# Patient Record
Sex: FEMALE | URBAN - METROPOLITAN AREA
[De-identification: names, ages, dates, MRNs, and addresses within clinical notes are randomized per-mention and may not be internally consistent; named-entity substitution may affect disease eponyms.]

---

## 2022-08-12 ENCOUNTER — APPOINTMENT (OUTPATIENT)
Dept: URBAN - METROPOLITAN AREA CLINIC 204 | Age: 32
Setting detail: DERMATOLOGY
End: 2022-08-12

## 2022-08-12 DIAGNOSIS — Q828 OTHER SPECIFIED ANOMALIES OF SKIN: ICD-10-CM

## 2022-08-12 DIAGNOSIS — Q819 OTHER SPECIFIED ANOMALIES OF SKIN: ICD-10-CM

## 2022-08-12 DIAGNOSIS — Q826 OTHER SPECIFIED ANOMALIES OF SKIN: ICD-10-CM

## 2022-08-12 DIAGNOSIS — L30.0 NUMMULAR DERMATITIS: ICD-10-CM

## 2022-08-12 PROBLEM — L85.8 OTHER SPECIFIED EPIDERMAL THICKENING: Status: ACTIVE | Noted: 2022-08-12

## 2022-08-12 PROCEDURE — OTHER PRESCRIPTION: OTHER

## 2022-08-12 PROCEDURE — OTHER COUNSELING: OTHER

## 2022-08-12 PROCEDURE — 99204 OFFICE O/P NEW MOD 45 MIN: CPT

## 2022-08-12 RX ORDER — TRIAMCINOLONE ACETONIDE 1 MG/G
CREAM TOPICAL TWICE A DAY
Qty: 80 | Refills: 5 | Status: ERX | COMMUNITY
Start: 2022-08-12

## 2022-08-12 ASSESSMENT — LOCATION DETAILED DESCRIPTION DERM
LOCATION DETAILED: RIGHT PROXIMAL POSTERIOR UPPER ARM
LOCATION DETAILED: LEFT PROXIMAL POSTERIOR UPPER ARM
LOCATION DETAILED: LEFT ULNAR DORSAL HAND
LOCATION DETAILED: LEFT DORSAL FOOT
LOCATION DETAILED: RIGHT ULNAR DORSAL HAND

## 2022-08-12 ASSESSMENT — LOCATION SIMPLE DESCRIPTION DERM
LOCATION SIMPLE: RIGHT HAND
LOCATION SIMPLE: LEFT HAND
LOCATION SIMPLE: RIGHT POSTERIOR UPPER ARM
LOCATION SIMPLE: LEFT FOOT
LOCATION SIMPLE: LEFT POSTERIOR UPPER ARM

## 2022-08-12 ASSESSMENT — LOCATION ZONE DERM
LOCATION ZONE: FEET
LOCATION ZONE: HAND
LOCATION ZONE: ARM

## 2022-08-12 NOTE — HPI: SKIN LESION
How Severe Is Your Skin Lesion?: mild
Has Your Skin Lesion Been Treated?: not been treated
Is This A New Presentation, Or A Follow-Up?: Skin Lesion Referral
Additional History: Pt states that she has been putting clobetasol ointment on it and was given prednisone for it.
Who Is Your Referring Provider?:

## 2023-04-26 LAB — GROUP A STREP, PCR: NOT DETECTED

## 2023-09-19 PROBLEM — D68.51 FACTOR V LEIDEN MUTATION (MULTI): Status: ACTIVE | Noted: 2023-09-19

## 2023-09-19 PROBLEM — E66.9 OBESITY: Status: ACTIVE | Noted: 2023-09-19

## 2023-09-19 PROBLEM — K21.9 GASTROESOPHAGEAL REFLUX DISEASE WITHOUT ESOPHAGITIS: Status: ACTIVE | Noted: 2023-09-19

## 2023-09-19 PROBLEM — Z79.899 MEDICATION MANAGEMENT: Status: ACTIVE | Noted: 2023-09-19

## 2023-09-19 RX ORDER — TRIAMCINOLONE ACETONIDE 1 MG/G
CREAM TOPICAL
COMMUNITY
Start: 2023-01-10 | End: 2023-10-19 | Stop reason: ALTCHOICE

## 2023-09-19 RX ORDER — APIXABAN 2.5 MG/1
1 TABLET, FILM COATED ORAL 2 TIMES DAILY
COMMUNITY
Start: 2019-10-17 | End: 2023-10-19 | Stop reason: SDUPTHER

## 2023-09-19 RX ORDER — PANTOPRAZOLE SODIUM 40 MG/1
1 TABLET, DELAYED RELEASE ORAL DAILY
COMMUNITY
Start: 2020-09-01 | End: 2023-10-19 | Stop reason: ALTCHOICE

## 2023-09-19 RX ORDER — CLOBETASOL PROPIONATE 0.5 MG/G
OINTMENT TOPICAL 2 TIMES DAILY
COMMUNITY
Start: 2022-06-27 | End: 2023-10-19 | Stop reason: ALTCHOICE

## 2023-10-16 ENCOUNTER — LAB (OUTPATIENT)
Dept: LAB | Facility: LAB | Age: 33
End: 2023-10-16

## 2023-10-16 DIAGNOSIS — I82.409 ACUTE EMBOLISM AND THROMBOSIS OF UNSPECIFIED DEEP VEINS OF UNSPECIFIED LOWER EXTREMITY (MULTI): Primary | ICD-10-CM

## 2023-10-16 DIAGNOSIS — I82.409 ACUTE EMBOLISM AND THROMBOSIS OF UNSPECIFIED DEEP VEINS OF UNSPECIFIED LOWER EXTREMITY (MULTI): ICD-10-CM

## 2023-10-16 LAB
ALBUMIN SERPL BCP-MCNC: 4.3 G/DL (ref 3.4–5)
ALP SERPL-CCNC: 57 U/L (ref 33–110)
ALT SERPL W P-5'-P-CCNC: 15 U/L (ref 7–45)
ANION GAP SERPL CALC-SCNC: 11 MMOL/L (ref 10–20)
AST SERPL W P-5'-P-CCNC: 15 U/L (ref 9–39)
BASOPHILS # BLD AUTO: 0.09 X10*3/UL (ref 0–0.1)
BASOPHILS NFR BLD AUTO: 0.9 %
BILIRUB SERPL-MCNC: 0.3 MG/DL (ref 0–1.2)
BUN SERPL-MCNC: 10 MG/DL (ref 6–23)
CALCIUM SERPL-MCNC: 9.4 MG/DL (ref 8.6–10.3)
CHLORIDE SERPL-SCNC: 102 MMOL/L (ref 98–107)
CO2 SERPL-SCNC: 28 MMOL/L (ref 21–32)
CREAT SERPL-MCNC: 0.85 MG/DL (ref 0.5–1.05)
D DIMER PPP FEU-MCNC: 407 NG/ML FEU
EOSINOPHIL # BLD AUTO: 0.17 X10*3/UL (ref 0–0.7)
EOSINOPHIL NFR BLD AUTO: 1.7 %
ERYTHROCYTE [DISTWIDTH] IN BLOOD BY AUTOMATED COUNT: 12.2 % (ref 11.5–14.5)
GFR SERPL CREATININE-BSD FRML MDRD: >90 ML/MIN/1.73M*2
GLUCOSE SERPL-MCNC: 86 MG/DL (ref 74–99)
HCT VFR BLD AUTO: 45.5 % (ref 36–46)
HGB BLD-MCNC: 14.4 G/DL (ref 12–16)
IMM GRANULOCYTES # BLD AUTO: 0.03 X10*3/UL (ref 0–0.7)
IMM GRANULOCYTES NFR BLD AUTO: 0.3 % (ref 0–0.9)
LYMPHOCYTES # BLD AUTO: 3.37 X10*3/UL (ref 1.2–4.8)
LYMPHOCYTES NFR BLD AUTO: 33.5 %
MCH RBC QN AUTO: 28.6 PG (ref 26–34)
MCHC RBC AUTO-ENTMCNC: 31.6 G/DL (ref 32–36)
MCV RBC AUTO: 90 FL (ref 80–100)
MONOCYTES # BLD AUTO: 0.59 X10*3/UL (ref 0.1–1)
MONOCYTES NFR BLD AUTO: 5.9 %
NEUTROPHILS # BLD AUTO: 5.8 X10*3/UL (ref 1.2–7.7)
NEUTROPHILS NFR BLD AUTO: 57.7 %
NRBC BLD-RTO: 0 /100 WBCS (ref 0–0)
PLATELET # BLD AUTO: 336 X10*3/UL (ref 150–450)
PMV BLD AUTO: 9.3 FL (ref 7.5–11.5)
POTASSIUM SERPL-SCNC: 3.9 MMOL/L (ref 3.5–5.3)
PROT SERPL-MCNC: 7.6 G/DL (ref 6.4–8.2)
RBC # BLD AUTO: 5.04 X10*6/UL (ref 4–5.2)
SODIUM SERPL-SCNC: 137 MMOL/L (ref 136–145)
WBC # BLD AUTO: 10.1 X10*3/UL (ref 4.4–11.3)

## 2023-10-16 PROCEDURE — 36415 COLL VENOUS BLD VENIPUNCTURE: CPT

## 2023-10-16 PROCEDURE — 85025 COMPLETE CBC W/AUTO DIFF WBC: CPT

## 2023-10-16 PROCEDURE — 80053 COMPREHEN METABOLIC PANEL: CPT

## 2023-10-16 PROCEDURE — 85379 FIBRIN DEGRADATION QUANT: CPT

## 2023-10-19 ENCOUNTER — OFFICE VISIT (OUTPATIENT)
Dept: HEMATOLOGY/ONCOLOGY | Facility: CLINIC | Age: 33
End: 2023-10-19
Payer: COMMERCIAL

## 2023-10-19 VITALS
WEIGHT: 281.53 LBS | RESPIRATION RATE: 18 BRPM | BODY MASS INDEX: 49.88 KG/M2 | OXYGEN SATURATION: 96 % | SYSTOLIC BLOOD PRESSURE: 127 MMHG | HEART RATE: 91 BPM | DIASTOLIC BLOOD PRESSURE: 88 MMHG | HEIGHT: 63 IN

## 2023-10-19 DIAGNOSIS — D68.51 FACTOR V LEIDEN MUTATION (MULTI): Primary | ICD-10-CM

## 2023-10-19 PROCEDURE — 99213 OFFICE O/P EST LOW 20 MIN: CPT

## 2023-10-19 RX ORDER — APIXABAN 2.5 MG/1
2.5 TABLET, FILM COATED ORAL 2 TIMES DAILY
Qty: 30 TABLET | Refills: 6 | Status: SHIPPED | OUTPATIENT
Start: 2023-10-19 | End: 2023-12-15

## 2023-10-19 ASSESSMENT — PAIN SCALES - GENERAL: PAINLEVEL: 0-NO PAIN

## 2023-10-19 NOTE — PROGRESS NOTES
Patient ID: Page Solares is a 33 y.o. female.    Subjective    HPI    Patient Visit Information:   Visit Type: Follow Up Visit     History of Present Illness:     ID Statement:    PAGE SOLARES is a 33 year old Female      Chief Complaint: Evaluation for hx of DVT   Interval History:    Referred by Dr. Ugalde     Reason for referral: DVT     HPI   32 year old woman with hx of GERD, obesity and DVT referred for evaluation of hx of DVT and heterozygous FVL mutation     she had 2 blood clots 10 years in 2012 and then 6 years ago in 2016   it was a [employer]   DVT in [place]  she saw a hematologist   hypercoagulable testing per patient showed heterozygous FVL mutation   she has been on Eliquis 2.5 mg twice a day , Dr. Ugalde lowered the dose about a year ago, no complications   no new events since 6 years ago     the first blood clot she was on OCP and she was not moving a lot in her work , she completed around 6-8 months of warfarin   the second episode she started having pain behind the knee   she has no major complications with the Eliquis     no abd pain , no nausea or vomiting   she goes to gyn   no fever , infections   no dysuria, no other complaints     interval history - 10/19/23    she is doing well   Eating and drinking well   Abdominal pain/cramping with dairy occasionally  No leg pain, legs remain to tire easily   No numbness or tingling in hands or feet  left leg is slightly edema, constantly little a larger  she is on Eliquis 2.5 mg twice a day, and tolerating well   Regular since stopping birth control  No urinary issues  No nausea, vomiting, diarrhea, constipation   No hematuria, hematochezia or melena   No bruising or bleeding issues   Breathing is stable, no SOB  No chest pain or pressure    Wears compression socks on long trips, makes sure to takes break walks and standing while travels     Objective    BSA: There is no height or weight on file to calculate BSA.  There were no vitals taken for this  visit.     Physical Exam  Vitals and nursing note reviewed.   Constitutional:       General: She is not in acute distress.     Appearance: Normal appearance.   HENT:      Head: Normocephalic and atraumatic.      Mouth/Throat:      Mouth: Mucous membranes are moist.      Pharynx: Oropharynx is clear.   Eyes:      General: No scleral icterus.     Extraocular Movements: Extraocular movements intact.      Pupils: Pupils are equal, round, and reactive to light.   Cardiovascular:      Rate and Rhythm: Normal rate and regular rhythm.      Heart sounds: Normal heart sounds. No murmur heard.  Pulmonary:      Effort: Pulmonary effort is normal.      Breath sounds: Normal breath sounds. No wheezing.   Abdominal:      General: There is no distension.      Palpations: Abdomen is soft.      Tenderness: There is no abdominal tenderness.   Musculoskeletal:         General: Normal range of motion.      Cervical back: Normal range of motion and neck supple. No tenderness.   Skin:     General: Skin is warm and dry.   Neurological:      General: No focal deficit present.      Mental Status: She is alert and oriented to person, place, and time.   Psychiatric:         Mood and Affect: Mood normal.         Behavior: Behavior normal.         Thought Content: Thought content normal.         Judgment: Judgment normal.         Performance Status:  Asymptomatic      Assessment/Plan   Assessment and Plan:   Assessment:    1. LLE DVT     no records available but per history the patient has hx of first episode of possible provoked episode in setting of OCP use, s/p about 8 months of warfarin     this was followed by an unprovoked event and since then on Eliquis, we discussed option to continue at 5 mg twice a day or 2.5 mg twice a day and she will continue with same     per patient also her hypercoagulable testing showed heterozygous FVL mutation, will not repeat testing now as it will not      10/19/23- Patient is doing well  today. Denies any abnormal bleeding or bruising issues. She denies any clotting concerns, no leg pain, or increased swelling.  She is eating and drinking well. She remains on Eliquis 2.5 mg twice a day, with no issues.   Patient is no longer taking birth control- discussed management in the event she becomes pregnant, she should notify our office immediately to start Lovenox injections during pregnancy through 6 weeks post-partum.     Recent labs are stable, Hg 14.4, Plts 336k, D-dimer 407. No bleeding concerns or new clinical concerns.   Plan is to remain on Eliquis 2.5mg BID, with continuation of monitoring her labs periodically for bleeding risks      RTC 6 m with labs (CBC, CMP, D-dimer)       Problem List Items Addressed This Visit          Coag and Thromboembolic    Factor V Leiden mutation (CMS/HCC) - Primary    Relevant Medications    Eliquis 2.5 mg tablet    Other Relevant Orders    CBC and Auto Differential    Comprehensive metabolic panel    D-dimer, Non VTE    Clinic Appointment Request            CLAUDE Crisostomo-CNP

## 2023-10-19 NOTE — PATIENT INSTRUCTIONS
Have labs at the hospital one week prior to appt.  Non-fasting.  Followup with ALEXSANDRA Pittman April 18, 2024 at 900

## 2023-12-15 ENCOUNTER — INFUSION (OUTPATIENT)
Dept: HEMATOLOGY/ONCOLOGY | Facility: CLINIC | Age: 33
End: 2023-12-15
Payer: COMMERCIAL

## 2023-12-15 ENCOUNTER — OFFICE VISIT (OUTPATIENT)
Dept: HEMATOLOGY/ONCOLOGY | Facility: CLINIC | Age: 33
End: 2023-12-15
Payer: COMMERCIAL

## 2023-12-15 VITALS
OXYGEN SATURATION: 97 % | WEIGHT: 288.2 LBS | SYSTOLIC BLOOD PRESSURE: 121 MMHG | BODY MASS INDEX: 51.07 KG/M2 | TEMPERATURE: 97.7 F | RESPIRATION RATE: 16 BRPM | HEART RATE: 80 BPM | HEIGHT: 63 IN | DIASTOLIC BLOOD PRESSURE: 82 MMHG

## 2023-12-15 DIAGNOSIS — D68.51 FACTOR V LEIDEN MUTATION (MULTI): ICD-10-CM

## 2023-12-15 DIAGNOSIS — Z3A.01 LESS THAN 8 WEEKS GESTATION OF PREGNANCY (HHS-HCC): Primary | ICD-10-CM

## 2023-12-15 LAB
ALBUMIN SERPL BCP-MCNC: 4.1 G/DL (ref 3.4–5)
ALP SERPL-CCNC: 48 U/L (ref 33–110)
ALT SERPL W P-5'-P-CCNC: 20 U/L (ref 7–45)
ANION GAP SERPL CALC-SCNC: 13 MMOL/L (ref 10–20)
AST SERPL W P-5'-P-CCNC: 20 U/L (ref 9–39)
B-HCG SERPL-ACNC: 4176 MIU/ML
BASOPHILS # BLD AUTO: 0.08 X10*3/UL (ref 0–0.1)
BASOPHILS NFR BLD AUTO: 0.8 %
BILIRUB SERPL-MCNC: 0.3 MG/DL (ref 0–1.2)
BUN SERPL-MCNC: 9 MG/DL (ref 6–23)
CALCIUM SERPL-MCNC: 9.2 MG/DL (ref 8.6–10.3)
CHLORIDE SERPL-SCNC: 104 MMOL/L (ref 98–107)
CO2 SERPL-SCNC: 24 MMOL/L (ref 21–32)
CREAT SERPL-MCNC: 0.71 MG/DL (ref 0.5–1.05)
EOSINOPHIL # BLD AUTO: 0.12 X10*3/UL (ref 0–0.7)
EOSINOPHIL NFR BLD AUTO: 1.2 %
ERYTHROCYTE [DISTWIDTH] IN BLOOD BY AUTOMATED COUNT: 13 % (ref 11.5–14.5)
GFR SERPL CREATININE-BSD FRML MDRD: >90 ML/MIN/1.73M*2
GLUCOSE SERPL-MCNC: 88 MG/DL (ref 74–99)
HCT VFR BLD AUTO: 42.2 % (ref 36–46)
HGB BLD-MCNC: 13.9 G/DL (ref 12–16)
IMM GRANULOCYTES # BLD AUTO: 0.02 X10*3/UL (ref 0–0.7)
IMM GRANULOCYTES NFR BLD AUTO: 0.2 % (ref 0–0.9)
LYMPHOCYTES # BLD AUTO: 2.75 X10*3/UL (ref 1.2–4.8)
LYMPHOCYTES NFR BLD AUTO: 26.8 %
MCH RBC QN AUTO: 29 PG (ref 26–34)
MCHC RBC AUTO-ENTMCNC: 32.9 G/DL (ref 32–36)
MCV RBC AUTO: 88 FL (ref 80–100)
MONOCYTES # BLD AUTO: 0.73 X10*3/UL (ref 0.1–1)
MONOCYTES NFR BLD AUTO: 7.1 %
NEUTROPHILS # BLD AUTO: 6.58 X10*3/UL (ref 1.2–7.7)
NEUTROPHILS NFR BLD AUTO: 63.9 %
NRBC BLD-RTO: 0 /100 WBCS (ref 0–0)
PLATELET # BLD AUTO: 312 X10*3/UL (ref 150–450)
POTASSIUM SERPL-SCNC: 3.8 MMOL/L (ref 3.5–5.3)
PROT SERPL-MCNC: 7.2 G/DL (ref 6.4–8.2)
RBC # BLD AUTO: 4.79 X10*6/UL (ref 4–5.2)
SODIUM SERPL-SCNC: 137 MMOL/L (ref 136–145)
WBC # BLD AUTO: 10.3 X10*3/UL (ref 4.4–11.3)

## 2023-12-15 PROCEDURE — 36415 COLL VENOUS BLD VENIPUNCTURE: CPT

## 2023-12-15 PROCEDURE — 80053 COMPREHEN METABOLIC PANEL: CPT

## 2023-12-15 PROCEDURE — 85025 COMPLETE CBC W/AUTO DIFF WBC: CPT

## 2023-12-15 PROCEDURE — 99213 OFFICE O/P EST LOW 20 MIN: CPT

## 2023-12-15 PROCEDURE — 84702 CHORIONIC GONADOTROPIN TEST: CPT

## 2023-12-15 RX ORDER — ENOXAPARIN SODIUM 100 MG/ML
60 INJECTION SUBCUTANEOUS DAILY
Qty: 30 EACH | Refills: 0 | Status: SHIPPED | OUTPATIENT
Start: 2023-12-15 | End: 2024-01-03 | Stop reason: SDUPTHER

## 2023-12-15 ASSESSMENT — PAIN SCALES - GENERAL: PAINLEVEL: 2

## 2023-12-15 NOTE — PROGRESS NOTES
Pt added  to schedule today- new pregnancy  To start Lovenox injections- RX sent to [facility]- pt has done injections before- Balaji is available in the pharmacy this weekend if she needs any instructions  Follow up 12/29 930 with Toya to see how injections are going  Referral to [facility]- they will call pt to schedule  Labs drawn today after visit  Reviewed AVS with patient- patient verbalizes understanding

## 2023-12-15 NOTE — PROGRESS NOTES
Patient ID: Page Gonzalez is a 33 y.o. female.    Subjective    HPI  Chief Complaint: Evaluation for hx of DVT   Interval History:    Referred by Dr. Ugalde      Reason for referral: DVT      HPI   32 year old woman with hx of GERD, obesity and DVT referred for evaluation of hx of DVT and heterozygous FVL mutation      she had 2 blood clots 10 years in 2012 and then 6 years ago in 2016   it was a [employer]   DVT in LLE  she saw a hematologist   hypercoagulable testing per patient showed heterozygous FVL mutation   she has been on Eliquis 2.5 mg twice a day , Dr. Ugalde lowered the dose about a year ago, no complications   no new events since 6 years ago      the first blood clot she was on OCP and she was not moving a lot in her work , she completed around 6-8 months of warfarin   the second episode she started having pain behind the knee   she has no major complications with the Eliquis      no abd pain , no nausea or vomiting   she goes to gyn   no fever , infections   no dysuria, no other complaints      interval history - 12/15/23     Patient is in office due recent finding out she is pregnant  Found out she is expecting two days ago, estimates she is 4 weeks  Last menstrual cycle was November 10th     Nauseated, headaches, bloating   Denies any spotting or bleeding  Low energy, feels exhausted all the time, with decrease motivation    Discontinued Eliquis 2 days ago     Eating and drinking     Bilaterally intermittent leg pain, with some edema  No numbness or tingling in hands or feet  left leg is slightly edema, constantly little a larger    Breathing is stable, no SOB  No urinary issues    Mild abdominal cramping/pain     No hematuria, hematochezia or melena   No bruising or bleeding issues   No chest pain or pressure           Objective    BSA: There is no height or weight on file to calculate BSA.  There were no vitals taken for this visit.     Physical Exam    Performance  Status:  Asymptomatic      Assessment/Plan   1. LLE DVT      no records available but per history the patient has hx of first episode of possible provoked episode in setting of OCP use, s/p about 8 months of warfarin      this was followed by an unprovoked event and since then on Eliquis, we discussed option to continue at 5 mg twice a day or 2.5 mg twice a day and she will continue with same      per patient also her hypercoagulable testing showed heterozygous FVL mutation, will not repeat testing now as it will not       10/19/23- Patient is doing well today. Denies any abnormal bleeding or bruising issues. She denies any clotting concerns, no leg pain, or increased swelling.  She is eating and drinking well. She remains on Eliquis 2.5 mg twice a day, with no issues.   Patient is no longer taking birth control- discussed management in the event she becomes pregnant, she should notify our office immediately to start Lovenox injections during pregnancy through 6 weeks post-partum.      Recent labs are stable, Hg 14.4, Plts 336k, D-dimer 407. No bleeding concerns or new clinical concerns.   Plan is to remain on Eliquis 2.5mg BID, with continuation of monitoring her labs periodically for bleeding risks    12/15/23- Patient test positive for pregnancy two days ago. She is to discuss anticoagulation during pregnancy. As previous discussed, patient discontinued Eliquis.  She is having mild increase in edema in LLE, with intermittent pain. Breathing is stable. She denies any bleeding or bruising issues. Patients main symptoms are nausea, fatigue, mild abdominal cramping, and bloating. She is eating and drinking, but doesn't have much of an appetite.    Discussed with patient starting 60 mg Lovenox daily. Patient has been in contact with her OB/GYN office, patient will be scheduled to be seen at 8 -10 weeks. Recommendation as this time would be for patient to be seen by OB/GYN and be referred to a high  risk OB to further manage care of anticoagulation during pregnancy and delivery. Explained to patient that she would anticoagulated with lovenox/heparin through out pregnancy and 6 weeks post-partum.      E-scribed Lovenox 60 mg subcutaneous daily. Will draw labs today for a baseline, along with confirm pregnancy. Schedule follow-up in 2 weeks for medication check-up       Labs reviewed:  HCG- 4,176, CBC and CMP are stable.  WBC 10.3, Hgb 13.9, Plts 312k         Toya Mejia, APRN-CNP

## 2023-12-15 NOTE — PATIENT INSTRUCTIONS
Patient is approximately 4 weeks pregnant- stopped anticoagulation 2 days ago    Plan:   Start patient on Lovenox 60mg Sub q daily- escript sent to pharmacy  Start prenatal vitamins daily  Refer patient to High Risk OB   RTC in 2 weeks-

## 2023-12-29 ENCOUNTER — OFFICE VISIT (OUTPATIENT)
Dept: HEMATOLOGY/ONCOLOGY | Facility: CLINIC | Age: 33
End: 2023-12-29
Payer: COMMERCIAL

## 2023-12-29 DIAGNOSIS — Z3A.01 LESS THAN 8 WEEKS GESTATION OF PREGNANCY (HHS-HCC): ICD-10-CM

## 2023-12-29 DIAGNOSIS — D68.51 FACTOR V LEIDEN MUTATION (MULTI): ICD-10-CM

## 2023-12-29 PROCEDURE — 99212 OFFICE O/P EST SF 10 MIN: CPT

## 2023-12-29 RX ORDER — ENOXAPARIN SODIUM 100 MG/ML
60 INJECTION SUBCUTANEOUS EVERY 12 HOURS
Qty: 30 EACH | Refills: 3 | Status: SHIPPED | OUTPATIENT
Start: 2023-12-29 | End: 2024-01-03 | Stop reason: DRUGHIGH

## 2023-12-29 ASSESSMENT — PAIN SCALES - GENERAL: PAINLEVEL: 0-NO PAIN

## 2023-12-29 NOTE — PROGRESS NOTES
Patient ID: Page Gonzalez is a 33 y.o. female.    Primary Care Provider: Dashawn Ugalde MD  Visit Type:  Follow Up     Verbal consent was requested and obtained from patient on this date for a telehealth visit.    Subjective    HPI    32 year old woman with hx of GERD, obesity and DVT referred for evaluation of hx of DVT and heterozygous FVL mutation      she had 2 blood clots 10 years in 2012 and then 6 years ago in 2016   it was a [employer]ism   DVT in LLE  she saw a hematologist   hypercoagulable testing per patient showed heterozygous FVL mutation   she has been on Eliquis 2.5 mg twice a day , Dr. Ugalde lowered the dose about a year ago, no complications   no new events since 6 years ago      the first blood clot she was on OCP and she was not moving a lot in her work , she completed around 6-8 months of warfarin   the second episode she started having pain behind the knee   she has no major complications with the Eliquis      no abd pain , no nausea or vomiting   she goes to gyn   no fever , infections   no dysuria, no other complaints     Interval History 12/29/23- telehealth medication check    Scheduled to see [facility] to January 15, 2023  Doing well with administering injections   Slight bruising at injection sites, otherwise doing well   Leg has resolved   Drinking lots of water and walking more to alleviate leg pain  Breathing is stable  Eating and drinking well  Nausea, no vomiting  No bleeding issues   No new concerns        interval history - 12/15/23     Patient is in office due recent finding out she is pregnant  Found out she is expecting two days ago, estimates she is 4 weeks  Last menstrual cycle was November 10th      Nauseated, headaches, bloating   Denies any spotting or bleeding  Low energy, feels exhausted all the time, with decrease motivation     Discontinued Eliquis 2 days ago      Eating and drinking      Bilaterally intermittent leg pain, with some edema  No numbness or  tingling in hands or feet  left leg is slightly edema, constantly little a larger     Breathing is stable, no SOB  No urinary issues     Mild abdominal cramping/pain      No hematuria, hematochezia or melena   No bruising or bleeding issues   No chest pain or pressure          Objective    BSA: There is no height or weight on file to calculate BSA.  There were no vitals taken for this visit.     Physical Exam  Constitutional:       General: She is not in acute distress.  HENT:      Head: Normocephalic and atraumatic.      Mouth/Throat:      Mouth: Mucous membranes are moist.      Pharynx: Oropharynx is clear.   Pulmonary:      Effort: Pulmonary effort is normal.   Neurological:      General: No focal deficit present.      Mental Status: She is alert and oriented to person, place, and time.   Psychiatric:         Mood and Affect: Mood normal.         Behavior: Behavior normal.         Thought Content: Thought content normal.         Judgment: Judgment normal.         Performance Status:  Asymptomatic      Assessment/Plan   1. LLE DVT      no records available but per history the patient has hx of first episode of possible provoked episode in setting of OCP use, s/p about 8 months of warfarin      this was followed by an unprovoked event and since then on Eliquis, we discussed option to continue at 5 mg twice a day or 2.5 mg twice a day and she will continue with same      per patient also her hypercoagulable testing showed heterozygous FVL mutation, will not repeat testing now as it will not       10/19/23- Patient is doing well today. Denies any abnormal bleeding or bruising issues. She denies any clotting concerns, no leg pain, or increased swelling.  She is eating and drinking well. She remains on Eliquis 2.5 mg twice a day, with no issues.   Patient is no longer taking birth control- discussed management in the event she becomes pregnant, she should notify our office immediately to start Lovenox  injections during pregnancy through 6 weeks post-partum.      Recent labs are stable, Hg 14.4, Plts 336k, D-dimer 407. No bleeding concerns or new clinical concerns.   Plan is to remain on Eliquis 2.5mg BID, with continuation of monitoring her labs periodically for bleeding risks    12/15/23- Patient test positive for pregnancy two days ago. She is to discuss anticoagulation during pregnancy. As previous discussed, patient discontinued Eliquis.  She is having mild increase in edema in LLE, with intermittent pain. Breathing is stable. She denies any bleeding or bruising issues. Patients main symptoms are nausea, fatigue, mild abdominal cramping, and bloating. She is eating and drinking, but doesn't have much of an appetite.     Discussed with patient starting 60 mg Lovenox daily. Patient has been in contact with her OB/GYN office, patient will be scheduled to be seen at 8 -10 weeks. Recommendation as this time would be for patient to be seen by OB/GYN and be referred to a high risk OB to further manage care of anticoagulation during pregnancy and delivery. Explained to patient that she would anticoagulated with lovenox/heparin through out pregnancy and 6 weeks post-partum.      E-scribed Lovenox 60 mg subcutaneous daily. Will draw labs today for a baseline, along with confirm pregnancy. Schedule follow-up in 2 weeks for medication check-up       12/29/23: Patient is doing well with administering Lovenox injections. Reports slight bruising at injection sites, otherwise no bleeding or bruising issues. Mild nausea, but no vomiting. Eating and drinking well. Previous leg pain has resolved since starting Lovenox. Patient is over all feeling well considering. She is scheduled to see OB/Gyn in January. Will continue to monitor patient periodically for bleeding risk assessment.     E-scribed Lovenox 60mg          RTC 3-4 m with labs (CBC, CMP, D-dimer)     CLAUDE Crisostomo-CNP

## 2023-12-29 NOTE — PROGRESS NOTES
Pt had a phone visit today with CNP  Had appt on 4/18- pt wanted to go with the Feb follow up- changed to 2/19 at 230 with cnp

## 2024-01-03 ENCOUNTER — TELEPHONE (OUTPATIENT)
Dept: HEMATOLOGY/ONCOLOGY | Facility: CLINIC | Age: 34
End: 2024-01-03
Payer: COMMERCIAL

## 2024-01-03 DIAGNOSIS — Z3A.01 LESS THAN 8 WEEKS GESTATION OF PREGNANCY (HHS-HCC): ICD-10-CM

## 2024-01-03 DIAGNOSIS — D68.51 FACTOR V LEIDEN MUTATION (MULTI): ICD-10-CM

## 2024-01-03 RX ORDER — ENOXAPARIN SODIUM 100 MG/ML
60 INJECTION SUBCUTANEOUS DAILY
Qty: 30 EACH | Refills: 0 | Status: SHIPPED | OUTPATIENT
Start: 2024-01-03 | End: 2024-01-24 | Stop reason: ALTCHOICE

## 2024-01-12 DIAGNOSIS — O20.9 BLEEDING IN EARLY PREGNANCY (HHS-HCC): Primary | ICD-10-CM

## 2024-01-13 ENCOUNTER — LAB (OUTPATIENT)
Dept: LAB | Facility: LAB | Age: 34
End: 2024-01-13
Payer: COMMERCIAL

## 2024-01-13 DIAGNOSIS — O20.9 BLEEDING IN EARLY PREGNANCY (HHS-HCC): ICD-10-CM

## 2024-01-13 LAB
ABO GROUP (TYPE) IN BLOOD: NORMAL
ANTIBODY SCREEN: NORMAL
B-HCG SERPL-ACNC: ABNORMAL MIU/ML
RH FACTOR (ANTIGEN D): NORMAL

## 2024-01-13 PROCEDURE — 86850 RBC ANTIBODY SCREEN: CPT

## 2024-01-13 PROCEDURE — 86901 BLOOD TYPING SEROLOGIC RH(D): CPT

## 2024-01-13 PROCEDURE — 86900 BLOOD TYPING SEROLOGIC ABO: CPT

## 2024-01-13 PROCEDURE — 84702 CHORIONIC GONADOTROPIN TEST: CPT

## 2024-01-13 PROCEDURE — 36415 COLL VENOUS BLD VENIPUNCTURE: CPT

## 2024-01-17 ENCOUNTER — TELEPHONE (OUTPATIENT)
Dept: HEMATOLOGY/ONCOLOGY | Facility: CLINIC | Age: 34
End: 2024-01-17
Payer: COMMERCIAL

## 2024-01-18 DIAGNOSIS — O03.9 MISCARRIAGE (HHS-HCC): Primary | ICD-10-CM

## 2024-01-20 ENCOUNTER — LAB (OUTPATIENT)
Dept: LAB | Facility: LAB | Age: 34
End: 2024-01-20
Payer: COMMERCIAL

## 2024-01-20 DIAGNOSIS — O03.9 MISCARRIAGE (HHS-HCC): ICD-10-CM

## 2024-01-20 LAB — B-HCG SERPL-ACNC: 2458 MIU/ML

## 2024-01-20 PROCEDURE — 84702 CHORIONIC GONADOTROPIN TEST: CPT

## 2024-01-20 PROCEDURE — 36415 COLL VENOUS BLD VENIPUNCTURE: CPT

## 2024-01-22 DIAGNOSIS — O03.9 COMPLETE OR UNSPECIFIED SPONTANEOUS ABORTION WITHOUT COMPLICATION (HHS-HCC): Primary | ICD-10-CM

## 2024-01-23 DIAGNOSIS — O03.9 COMPLETE OR UNSPECIFIED SPONTANEOUS ABORTION WITHOUT COMPLICATION (HHS-HCC): Primary | ICD-10-CM

## 2024-01-27 ENCOUNTER — LAB (OUTPATIENT)
Dept: LAB | Facility: LAB | Age: 34
End: 2024-01-27
Payer: COMMERCIAL

## 2024-01-27 DIAGNOSIS — O03.9 COMPLETE OR UNSPECIFIED SPONTANEOUS ABORTION WITHOUT COMPLICATION (HHS-HCC): ICD-10-CM

## 2024-01-27 LAB — B-HCG SERPL-ACNC: 126 MIU/ML

## 2024-01-27 PROCEDURE — 36415 COLL VENOUS BLD VENIPUNCTURE: CPT

## 2024-01-27 PROCEDURE — 84702 CHORIONIC GONADOTROPIN TEST: CPT

## 2024-01-30 DIAGNOSIS — O02.1 MISSED AB (HHS-HCC): Primary | ICD-10-CM

## 2024-02-02 ENCOUNTER — LAB (OUTPATIENT)
Dept: LAB | Facility: LAB | Age: 34
End: 2024-02-02
Payer: COMMERCIAL

## 2024-02-02 DIAGNOSIS — O02.1 MISSED AB (HHS-HCC): ICD-10-CM

## 2024-02-02 LAB — B-HCG SERPL-ACNC: 28 MIU/ML

## 2024-02-02 PROCEDURE — 84702 CHORIONIC GONADOTROPIN TEST: CPT

## 2024-02-02 PROCEDURE — 36415 COLL VENOUS BLD VENIPUNCTURE: CPT

## 2024-02-05 DIAGNOSIS — O02.1 MISSED ABORTION (HHS-HCC): Primary | ICD-10-CM

## 2024-02-10 ENCOUNTER — LAB (OUTPATIENT)
Dept: LAB | Facility: LAB | Age: 34
End: 2024-02-10
Payer: COMMERCIAL

## 2024-02-10 DIAGNOSIS — O02.1 MISSED ABORTION (HHS-HCC): ICD-10-CM

## 2024-02-10 LAB — B-HCG SERPL-ACNC: 14 MIU/ML

## 2024-02-10 PROCEDURE — 36415 COLL VENOUS BLD VENIPUNCTURE: CPT

## 2024-02-10 PROCEDURE — 84702 CHORIONIC GONADOTROPIN TEST: CPT

## 2024-02-12 DIAGNOSIS — O02.1 MISSED ABORTION (HHS-HCC): Primary | ICD-10-CM

## 2024-02-19 ENCOUNTER — OFFICE VISIT (OUTPATIENT)
Dept: HEMATOLOGY/ONCOLOGY | Facility: CLINIC | Age: 34
End: 2024-02-19
Payer: COMMERCIAL

## 2024-02-19 VITALS
TEMPERATURE: 97.7 F | RESPIRATION RATE: 16 BRPM | BODY MASS INDEX: 50.39 KG/M2 | WEIGHT: 284.4 LBS | HEART RATE: 94 BPM | OXYGEN SATURATION: 98 % | HEIGHT: 63 IN | DIASTOLIC BLOOD PRESSURE: 88 MMHG | SYSTOLIC BLOOD PRESSURE: 130 MMHG

## 2024-02-19 DIAGNOSIS — D68.51 FACTOR V LEIDEN MUTATION (MULTI): ICD-10-CM

## 2024-02-19 PROCEDURE — 99213 OFFICE O/P EST LOW 20 MIN: CPT

## 2024-02-19 PROCEDURE — 1036F TOBACCO NON-USER: CPT

## 2024-02-19 ASSESSMENT — PATIENT HEALTH QUESTIONNAIRE - PHQ9
2. FEELING DOWN, DEPRESSED OR HOPELESS: MORE THAN HALF THE DAYS
3. TROUBLE FALLING OR STAYING ASLEEP OR SLEEPING TOO MUCH: NEARLY EVERY DAY
7. TROUBLE CONCENTRATING ON THINGS, SUCH AS READING THE NEWSPAPER OR WATCHING TELEVISION: NOT AT ALL
1. LITTLE INTEREST OR PLEASURE IN DOING THINGS: MORE THAN HALF THE DAYS
8. MOVING OR SPEAKING SO SLOWLY THAT OTHER PEOPLE COULD HAVE NOTICED. OR THE OPPOSITE, BEING SO FIGETY OR RESTLESS THAT YOU HAVE BEEN MOVING AROUND A LOT MORE THAN USUAL: NOT AT ALL
SUM OF ALL RESPONSES TO PHQ QUESTIONS 1-9: 13
4. FEELING TIRED OR HAVING LITTLE ENERGY: NEARLY EVERY DAY
SUM OF ALL RESPONSES TO PHQ9 QUESTIONS 1 AND 2: 4
9. THOUGHTS THAT YOU WOULD BE BETTER OFF DEAD, OR OF HURTING YOURSELF: NOT AT ALL
5. POOR APPETITE OR OVEREATING: SEVERAL DAYS
10. IF YOU CHECKED OFF ANY PROBLEMS, HOW DIFFICULT HAVE THESE PROBLEMS MADE IT FOR YOU TO DO YOUR WORK, TAKE CARE OF THINGS AT HOME, OR GET ALONG WITH OTHER PEOPLE: VERY DIFFICULT
6. FEELING BAD ABOUT YOURSELF - OR THAT YOU ARE A FAILURE OR HAVE LET YOURSELF OR YOUR FAMILY DOWN: MORE THAN HALF THE DAYS

## 2024-02-19 ASSESSMENT — COLUMBIA-SUICIDE SEVERITY RATING SCALE - C-SSRS
6. HAVE YOU EVER DONE ANYTHING, STARTED TO DO ANYTHING, OR PREPARED TO DO ANYTHING TO END YOUR LIFE?: NO
2. HAVE YOU ACTUALLY HAD ANY THOUGHTS OF KILLING YOURSELF?: NO
1. IN THE PAST MONTH, HAVE YOU WISHED YOU WERE DEAD OR WISHED YOU COULD GO TO SLEEP AND NOT WAKE UP?: NO

## 2024-02-19 ASSESSMENT — PAIN SCALES - GENERAL: PAINLEVEL: 0-NO PAIN

## 2024-02-19 NOTE — PROGRESS NOTES
Patient ID: Page Gonzalez is a 34 y.o. female.    Subjective    HPI  HPI     32 year old woman with hx of GERD, obesity and DVT referred for evaluation of hx of DVT and heterozygous FVL mutation      she had 2 blood clots 10 years in 2012 and then 6 years ago in 2016   it was a [employer]   DVT in LLE  she saw a hematologist   hypercoagulable testing per patient showed heterozygous FVL mutation   she has been on Eliquis 2.5 mg twice a day , Dr. Ugalde lowered the dose about a year ago, no complications   no new events since 6 years ago      the first blood clot she was on OCP and she was not moving a lot in her work , she completed around 6-8 months of warfarin   the second episode she started having pain behind the knee   she has no major complications with the Eliquis      no abd pain , no nausea or vomiting   she goes to gyn   no fever , infections   no dysuria, no other complaints        interval history - 12/15/23     Energy is low  Switched back to Eliquis and is tolerating well   No abnormal bleeding or bruising   Walking 3 times a week    Leg pain has resolved   Drinking lots of water  Breathing is stable  No vaginal bleeding   No abdominal cramping or pain   No Urinary issues   Edema LLE, unchanged,  resolves with elavation   Eating and drinking well  Occasional Nausea, no vomiting  No bleeding issues   No new concerns            Mild abdominal cramping/pain      No hematuria, hematochezia or melena     No chest pain or pressure      Objective    BSA: There is no height or weight on file to calculate BSA.  There were no vitals taken for this visit.     Physical Exam  Vitals and nursing note reviewed.   Constitutional:       Appearance: Normal appearance.   HENT:      Head: Normocephalic and atraumatic.      Mouth/Throat:      Pharynx: Oropharynx is clear.   Eyes:      General: No scleral icterus.     Extraocular Movements: Extraocular movements intact.      Conjunctiva/sclera: Conjunctivae normal.    Cardiovascular:      Rate and Rhythm: Normal rate and regular rhythm.      Pulses: Normal pulses.      Heart sounds: Normal heart sounds. No murmur heard.  Pulmonary:      Effort: Pulmonary effort is normal.      Breath sounds: Normal breath sounds.   Abdominal:      Palpations: Abdomen is soft.      Tenderness: There is no abdominal tenderness.   Musculoskeletal:         General: Normal range of motion.      Cervical back: Normal range of motion.   Skin:     General: Skin is warm and dry.   Neurological:      General: No focal deficit present.      Mental Status: She is alert and oriented to person, place, and time.   Psychiatric:         Mood and Affect: Mood normal. Affect is tearful.         Behavior: Behavior normal.         Thought Content: Thought content normal.         Judgment: Judgment normal.         Performance Status:  Asymptomatic      Assessment/Plan   1. LLE DVT      no records available but per history the patient has hx of first episode of possible provoked episode in setting of OCP use, s/p about 8 months of warfarin      this was followed by an unprovoked event and since then on Eliquis, we discussed option to continue at 5 mg twice a day or 2.5 mg twice a day and she will continue with same      per patient also her hypercoagulable testing showed heterozygous FVL mutation, will not repeat testing now as it will not       10/19/23- Patient is doing well today. Denies any abnormal bleeding or bruising issues. She denies any clotting concerns, no leg pain, or increased swelling.  She is eating and drinking well. She remains on Eliquis 2.5 mg twice a day, with no issues.   Patient is no longer taking birth control- discussed management in the event she becomes pregnant, she should notify our office immediately to start Lovenox injections during pregnancy through 6 weeks post-partum.      Recent labs are stable, Hg 14.4, Plts 336k, D-dimer 407. No bleeding concerns or new clinical  concerns.   Plan is to remain on Eliquis 2.5mg BID, with continuation of monitoring her labs periodically for bleeding risks     12/15/23- Patient test positive for pregnancy two days ago. She is to discuss anticoagulation during pregnancy. As previous discussed, patient discontinued Eliquis.  She is having mild increase in edema in LLE, with intermittent pain. Breathing is stable. She denies any bleeding or bruising issues. Patients main symptoms are nausea, fatigue, mild abdominal cramping, and bloating. She is eating and drinking, but doesn't have much of an appetite.     Discussed with patient starting 60 mg Lovenox daily. Patient has been in contact with her OB/GYN office, patient will be scheduled to be seen at 8 -10 weeks. Recommendation as this time would be for patient to be seen by OB/GYN and be referred to a high risk OB to further manage care of anticoagulation during pregnancy and delivery. Explained to patient that she would anticoagulated with lovenox/heparin through out pregnancy and 6 weeks post-partum.      E-scribed Lovenox 60 mg subcutaneous daily. Will draw labs today for a baseline, along with confirm pregnancy. Schedule follow-up in 2 weeks for medication check-up        12/29/23: Patient is doing well with administering Lovenox injections. Reports slight bruising at injection sites, otherwise no bleeding or bruising issues. Mild nausea, but no vomiting. Eating and drinking well. Previous leg pain has resolved since starting Lovenox. Patient is over all feeling well considering. She is scheduled to see OB/Gyn in January. Will continue to monitor patient periodically for bleeding risk assessment.      E-scribed Lovenox 60mg      2/20/24: Patient recently miscarried, and has transitioned back to taking oral anticoagulation, Eliquis 2.5mg twice daily, tolerating well.  HCG remains slightly elevated, continues to be monitored closely by OB/GYN. She is doing well physically. Denies any abnormal  bleeding or bruising. Leg pain has resolved. Breathing is stable. Occasionally nauseated, no vomiting. Eating and drinking well. No new clinical concerns.     Escribed Eliquis 2.5 twice daily to pharm        RTC 4-5 m with labs (CBC, CMP, D-dimer)        Toya Mejia, CLAUDE-CNP

## 2024-02-19 NOTE — PROGRESS NOTES
Pt instructed to get labs prior to next appt- at the [facility]  Rtc for visit with CNP 8/19 at 230p  Reviewed AVS with patient- patient verbalizes understanding

## 2024-02-23 ENCOUNTER — LAB (OUTPATIENT)
Dept: LAB | Facility: LAB | Age: 34
End: 2024-02-23
Payer: COMMERCIAL

## 2024-02-23 DIAGNOSIS — O02.1 MISSED ABORTION (HHS-HCC): ICD-10-CM

## 2024-02-23 LAB — B-HCG SERPL-ACNC: 4 MIU/ML

## 2024-02-23 PROCEDURE — 84702 CHORIONIC GONADOTROPIN TEST: CPT

## 2024-02-23 PROCEDURE — 36415 COLL VENOUS BLD VENIPUNCTURE: CPT

## 2024-04-18 ENCOUNTER — APPOINTMENT (OUTPATIENT)
Dept: HEMATOLOGY/ONCOLOGY | Facility: CLINIC | Age: 34
End: 2024-04-18
Payer: COMMERCIAL

## 2024-06-25 DIAGNOSIS — D68.51 FACTOR V LEIDEN MUTATION (MULTI): ICD-10-CM

## 2024-06-28 ENCOUNTER — OFFICE VISIT (OUTPATIENT)
Dept: HEMATOLOGY/ONCOLOGY | Facility: CLINIC | Age: 34
End: 2024-06-28
Payer: COMMERCIAL

## 2024-06-28 VITALS
HEART RATE: 103 BPM | BODY MASS INDEX: 50.14 KG/M2 | WEIGHT: 283 LBS | DIASTOLIC BLOOD PRESSURE: 82 MMHG | TEMPERATURE: 98.4 F | OXYGEN SATURATION: 95 % | RESPIRATION RATE: 16 BRPM | SYSTOLIC BLOOD PRESSURE: 132 MMHG

## 2024-06-28 DIAGNOSIS — D68.51 FACTOR V LEIDEN MUTATION (MULTI): ICD-10-CM

## 2024-06-28 LAB
ALBUMIN SERPL BCP-MCNC: 4.1 G/DL (ref 3.4–5)
ALP SERPL-CCNC: 51 U/L (ref 33–110)
ALT SERPL W P-5'-P-CCNC: 15 U/L (ref 7–45)
ANION GAP SERPL CALC-SCNC: 12 MMOL/L (ref 10–20)
AST SERPL W P-5'-P-CCNC: 17 U/L (ref 9–39)
B-HCG SERPL-ACNC: 1106 MIU/ML
BASOPHILS # BLD AUTO: 0.07 X10*3/UL (ref 0–0.1)
BASOPHILS NFR BLD AUTO: 0.7 %
BILIRUB SERPL-MCNC: 0.3 MG/DL (ref 0–1.2)
BUN SERPL-MCNC: 12 MG/DL (ref 6–23)
CALCIUM SERPL-MCNC: 8.9 MG/DL (ref 8.6–10.3)
CHLORIDE SERPL-SCNC: 105 MMOL/L (ref 98–107)
CO2 SERPL-SCNC: 25 MMOL/L (ref 21–32)
CREAT SERPL-MCNC: 0.79 MG/DL (ref 0.5–1.05)
EGFRCR SERPLBLD CKD-EPI 2021: >90 ML/MIN/1.73M*2
EOSINOPHIL # BLD AUTO: 0.18 X10*3/UL (ref 0–0.7)
EOSINOPHIL NFR BLD AUTO: 1.7 %
ERYTHROCYTE [DISTWIDTH] IN BLOOD BY AUTOMATED COUNT: 13.1 % (ref 11.5–14.5)
GLUCOSE SERPL-MCNC: 100 MG/DL (ref 74–99)
HCT VFR BLD AUTO: 41.4 % (ref 36–46)
HGB BLD-MCNC: 13.2 G/DL (ref 12–16)
IMM GRANULOCYTES # BLD AUTO: 0.03 X10*3/UL (ref 0–0.7)
IMM GRANULOCYTES NFR BLD AUTO: 0.3 % (ref 0–0.9)
LYMPHOCYTES # BLD AUTO: 2.43 X10*3/UL (ref 1.2–4.8)
LYMPHOCYTES NFR BLD AUTO: 22.7 %
MCH RBC QN AUTO: 28.2 PG (ref 26–34)
MCHC RBC AUTO-ENTMCNC: 31.9 G/DL (ref 32–36)
MCV RBC AUTO: 89 FL (ref 80–100)
MONOCYTES # BLD AUTO: 0.8 X10*3/UL (ref 0.1–1)
MONOCYTES NFR BLD AUTO: 7.5 %
NEUTROPHILS # BLD AUTO: 7.21 X10*3/UL (ref 1.2–7.7)
NEUTROPHILS NFR BLD AUTO: 67.1 %
NRBC BLD-RTO: 0 /100 WBCS (ref 0–0)
PLATELET # BLD AUTO: 299 X10*3/UL (ref 150–450)
POTASSIUM SERPL-SCNC: 4 MMOL/L (ref 3.5–5.3)
PROT SERPL-MCNC: 6.6 G/DL (ref 6.4–8.2)
RBC # BLD AUTO: 4.68 X10*6/UL (ref 4–5.2)
SODIUM SERPL-SCNC: 138 MMOL/L (ref 136–145)
WBC # BLD AUTO: 10.7 X10*3/UL (ref 4.4–11.3)

## 2024-06-28 PROCEDURE — 99214 OFFICE O/P EST MOD 30 MIN: CPT

## 2024-06-28 PROCEDURE — 84702 CHORIONIC GONADOTROPIN TEST: CPT

## 2024-06-28 PROCEDURE — 80053 COMPREHEN METABOLIC PANEL: CPT

## 2024-06-28 PROCEDURE — 85025 COMPLETE CBC W/AUTO DIFF WBC: CPT

## 2024-06-28 PROCEDURE — 36415 COLL VENOUS BLD VENIPUNCTURE: CPT

## 2024-06-28 ASSESSMENT — PATIENT HEALTH QUESTIONNAIRE - PHQ9
SUM OF ALL RESPONSES TO PHQ9 QUESTIONS 1 AND 2: 0
1. LITTLE INTEREST OR PLEASURE IN DOING THINGS: NOT AT ALL
2. FEELING DOWN, DEPRESSED OR HOPELESS: NOT AT ALL

## 2024-06-28 ASSESSMENT — COLUMBIA-SUICIDE SEVERITY RATING SCALE - C-SSRS
6. HAVE YOU EVER DONE ANYTHING, STARTED TO DO ANYTHING, OR PREPARED TO DO ANYTHING TO END YOUR LIFE?: NO
1. IN THE PAST MONTH, HAVE YOU WISHED YOU WERE DEAD OR WISHED YOU COULD GO TO SLEEP AND NOT WAKE UP?: NO
2. HAVE YOU ACTUALLY HAD ANY THOUGHTS OF KILLING YOURSELF?: NO

## 2024-06-28 ASSESSMENT — PAIN SCALES - GENERAL: PAINLEVEL: 0-NO PAIN

## 2024-06-28 NOTE — PROGRESS NOTES
Patient ID: Page Gonzalez is a 34 y.o. female.    Subjective    HPI    32 year old woman with hx of GERD, obesity and DVT referred for evaluation of hx of DVT and heterozygous FVL mutation      she had 2 blood clots 10 years in 2012 and then 6 years ago in 2016   it was a Oriental orthodox   DVT in LLE  she saw a hematologist   hypercoagulable testing per patient showed heterozygous FVL mutation   she has been on Eliquis 2.5 mg twice a day , Dr. Ugalde lowered the dose about a year ago, no complications   no new events since 6 years ago      the first blood clot she was on OCP and she was not moving a lot in her work , she completed around 6-8 months of warfarin   the second episode she started having pain behind the knee   she has no major complications with the Eliquis      no abd pain , no nausea or vomiting   she goes to gyn   no fever , infections   no dysuria, no other complaints         interval history - 6/28/24    approximately 8 weeks pregnant      Last cycle was 5/28/24     Energy is low  Stopped Eliquis and started Lovenox  Nausea, back hurts, heightened sense of smell  No abnormal bleeding or bruising   Leg pain has resolved   Drinking lots of water  Breathing is stable  No vaginal bleeding   No abdominal cramping or pain   No Urinary issues, no dysuria or hematuria   Edema LLE, unchanged,  resolves with elavation   Eating and drinking well  Occasional Nausea, no vomiting  No bleeding issues , no epistaxis, or bleeding gums  No recent infections  No new concerns      Mild abdominal cramping/pain      No hematuria, hematochezia or melena      No chest pain or pressure    Objective    BSA: There is no height or weight on file to calculate BSA.  There were no vitals taken for this visit.     Physical Exam  Vitals and nursing note reviewed.   Constitutional:       Appearance: Normal appearance.   HENT:      Head: Normocephalic and atraumatic.      Nose: Nose normal.      Mouth/Throat:      Mouth: Mucous membranes  are moist.      Pharynx: Oropharynx is clear.   Eyes:      General: No scleral icterus.     Extraocular Movements: Extraocular movements intact.      Conjunctiva/sclera: Conjunctivae normal.   Cardiovascular:      Rate and Rhythm: Normal rate and regular rhythm.      Pulses: Normal pulses.      Heart sounds: Normal heart sounds.   Pulmonary:      Effort: Pulmonary effort is normal.      Breath sounds: Normal breath sounds.   Abdominal:      Palpations: Abdomen is soft. There is no mass.      Tenderness: There is abdominal tenderness.   Musculoskeletal:         General: Normal range of motion.      Cervical back: Normal range of motion.   Skin:     General: Skin is warm and dry.   Neurological:      General: No focal deficit present.      Mental Status: She is alert and oriented to person, place, and time.   Psychiatric:         Mood and Affect: Mood normal.         Behavior: Behavior normal.         Thought Content: Thought content normal.         Judgment: Judgment normal.       Performance Status:  Symptomatic; fully ambulatory      Assessment/Plan   1. LLE DVT      no records available but per history the patient has hx of first episode of possible provoked episode in setting of OCP use, s/p about 8 months of warfarin      this was followed by an unprovoked event and since then on Eliquis, we discussed option to continue at 5 mg twice a day or 2.5 mg twice a day and she will continue with same      per patient also her hypercoagulable testing showed heterozygous FVL mutation, will not repeat testing now as it will not       10/19/23- Patient is doing well today. Denies any abnormal bleeding or bruising issues. She denies any clotting concerns, no leg pain, or increased swelling.  She is eating and drinking well. She remains on Eliquis 2.5 mg twice a day, with no issues.   Patient is no longer taking birth control- discussed management in the event she becomes pregnant, she should notify our  office immediately to start Lovenox injections during pregnancy through 6 weeks post-partum.      Recent labs are stable, Hg 14.4, Plts 336k, D-dimer 407. No bleeding concerns or new clinical concerns.   Plan is to remain on Eliquis 2.5mg BID, with continuation of monitoring her labs periodically for bleeding risks     6/28/24- Patient test positive for pregnancy. As previous discussed, patient discontinued Eliquis. She restarted Lovenox 60mg daily. She is experiencing nausea, fatigue, mild abdominal cramping/bloating. Eating and drinking, but doesn't have much of an appetite. Mild increase in edema in LLE, with intermittent pain. Breathing is stable. She denies any bleeding or bruising issues.     Discussed treatment plan with Dr. Jama, will increase patient due to high DVT risk to 100 mg Lovenox daily. Patient has been in contact with her OB/GYN office, patient will be scheduled to be seen at 8 -10 weeks. Recommendation as this time would be for patient to be seen by OB/GYN and be referred to a high risk OB to further manage care of anticoagulation during pregnancy and delivery. Explained to patient that she would anticoagulated with lovenox/heparin through out pregnancy and 6 weeks post-partum.             6/28/24:   Increase on Lovenox 100mg  BID  Labs today (CBC w/diff, CMP, HCG)  RTC 4-5 m with labs (CBC, CMP, D-dimer)          Toya Mejia, CLAUDE-CNP

## 2024-06-28 NOTE — PATIENT INSTRUCTIONS
Increase on Lovenox 100mg  BID  Labs today (CBC w/diff, CMP, HCG)  RTC in 4 months with labs prior     (CBC w/diff, CMP)

## 2024-06-28 NOTE — PROGRESS NOTES
Labs drawn at visit today  Rtc 10/31 3pm for CNP visit- labs at hosp prior  Reviewed AVS with patient- patient verbalizes understanding

## 2024-07-03 RX ORDER — ENOXAPARIN SODIUM 100 MG/ML
60 INJECTION SUBCUTANEOUS DAILY
Qty: 90 EACH | Refills: 2 | Status: SHIPPED | OUTPATIENT
Start: 2024-07-03 | End: 2024-07-04 | Stop reason: SDUPTHER

## 2024-07-04 RX ORDER — ENOXAPARIN SODIUM 100 MG/ML
60 INJECTION SUBCUTANEOUS 2 TIMES DAILY
Qty: 120 EACH | Refills: 3 | Status: SHIPPED | OUTPATIENT
Start: 2024-07-04

## 2024-07-05 ENCOUNTER — OFFICE VISIT (OUTPATIENT)
Dept: URGENT CARE | Facility: CLINIC | Age: 34
End: 2024-07-05
Payer: COMMERCIAL

## 2024-07-05 VITALS
TEMPERATURE: 97.8 F | HEART RATE: 70 BPM | WEIGHT: 280 LBS | OXYGEN SATURATION: 97 % | DIASTOLIC BLOOD PRESSURE: 84 MMHG | SYSTOLIC BLOOD PRESSURE: 123 MMHG | HEIGHT: 63 IN | RESPIRATION RATE: 16 BRPM | BODY MASS INDEX: 49.61 KG/M2

## 2024-07-05 DIAGNOSIS — H10.33 ACUTE BACTERIAL CONJUNCTIVITIS OF BOTH EYES: ICD-10-CM

## 2024-07-05 DIAGNOSIS — H65.01 NON-RECURRENT ACUTE SEROUS OTITIS MEDIA OF RIGHT EAR: ICD-10-CM

## 2024-07-05 DIAGNOSIS — J30.89 ENVIRONMENTAL AND SEASONAL ALLERGIES: Primary | ICD-10-CM

## 2024-07-05 PROCEDURE — 99213 OFFICE O/P EST LOW 20 MIN: CPT | Performed by: NURSE PRACTITIONER

## 2024-07-05 RX ORDER — FLUTICASONE PROPIONATE 50 MCG
1 SPRAY, SUSPENSION (ML) NASAL 2 TIMES DAILY
Qty: 16 G | Refills: 0 | Status: SHIPPED | OUTPATIENT
Start: 2024-07-05 | End: 2025-07-05

## 2024-07-05 RX ORDER — OFLOXACIN 3 MG/ML
1 SOLUTION/ DROPS OPHTHALMIC 3 TIMES DAILY
Qty: 5 ML | Refills: 0 | Status: SHIPPED | OUTPATIENT
Start: 2024-07-05 | End: 2024-07-12

## 2024-07-05 RX ORDER — AMOXICILLIN 875 MG/1
875 TABLET, FILM COATED ORAL 2 TIMES DAILY
Qty: 14 TABLET | Refills: 0 | Status: SHIPPED | OUTPATIENT
Start: 2024-07-05 | End: 2024-07-12

## 2024-07-05 NOTE — PROGRESS NOTES
34 y.o. female presents for evaluation of cough, rhinorrhea, bilateral ear pain and pressure, left eye irritation for the past 5 days.  States she has a history of severe seasonal allergies and takes daily Claritin.  Is 7 weeks pregnant.  She denies sore throat, fatigue, body aches, headache, chest pain, shortness of breath, nausea, vomiting, diarrhea or abdominal pain or any other associated symptom or complaint.  No known ill contacts.  No other complaints.      Vitals:    07/05/24 1550   BP: 123/84   Pulse: 70   Resp: 16   Temp: 36.6 °C (97.8 °F)   SpO2: 97%       No Known Allergies    Medication Documentation Review Audit       Reviewed by Melissa Nagel MA (Medical Assistant) on 07/05/24 at 1549      Medication Order Taking? Sig Documenting Provider Last Dose Status     Discontinued 07/04/24 6667   enoxaparin (Lovenox) 60 mg/0.6 mL syringe [id number] Yes Inject 0.6 mL (60 mg) under the skin 2 times a day. ROCKY Crisostomo Taking Active   loratadine (Claritin) 5 mg chewable tablet [id number] Yes Chew 1 tablet (5 mg) once daily. Historical Provider, MD Taking Active   prenatal vitamin, iron-folic, 27 mg iron-800 mcg folic acid tablet [id number] Yes Take 1 tablet by mouth once daily. ROCKY Crisostomo Taking Active                    History reviewed. No pertinent past medical history.    History reviewed. No pertinent surgical history.    ROS  See HPI    Physical Exam  Vitals and nursing note reviewed.   Constitutional:       General: She is not in acute distress.     Appearance: Normal appearance. She is normal weight. She is not ill-appearing or toxic-appearing.   HENT:      Head: Normocephalic and atraumatic.      Right Ear: Tympanic membrane and ear canal normal.      Left Ear: Tympanic membrane and ear canal normal.      Nose: Congestion present.      Mouth/Throat:      Mouth: Mucous membranes are moist.      Pharynx: Oropharynx is clear. No oropharyngeal exudate or posterior oropharyngeal  erythema.   Eyes:      General:         Right eye: No discharge.         Left eye: Discharge (Mildly erythematous conjunctiva) present.     Extraocular Movements: Extraocular movements intact.      Pupils: Pupils are equal, round, and reactive to light.   Cardiovascular:      Rate and Rhythm: Normal rate and regular rhythm.   Pulmonary:      Effort: Pulmonary effort is normal.      Breath sounds: Normal breath sounds.   Lymphadenopathy:      Cervical: No cervical adenopathy.   Skin:     General: Skin is warm and dry.   Neurological:      General: No focal deficit present.      Mental Status: She is alert and oriented to person, place, and time.   Psychiatric:         Mood and Affect: Mood normal.         Behavior: Behavior normal.           Assessment/Plan/MDM  Page was seen today for uri.  Diagnoses and all orders for this visit:  Environmental and seasonal allergies (Primary)  -     fluticasone (Flonase) 50 mcg/actuation nasal spray; Administer 1 spray into each nostril 2 times a day. Shake gently. Before first use, prime pump. After use, clean tip and replace cap.  Non-recurrent acute serous otitis media of right ear  -     amoxicillin (Amoxil) 875 mg tablet; Take 1 tablet (875 mg) by mouth 2 times a day for 7 days.  Acute bacterial conjunctivitis of both eyes  -     ofloxacin (Ocuflox) 0.3 % ophthalmic solution; Administer 1 drop into both eyes 3 times a day for 7 days.    Encouraged pt to continue pregnancy safe otc cold remedies PRN, push PO fluids and rest. Patient's clinical presentation is otherwise unremarkable at this time. Patient is discharged with instructions to follow-up with primary care or seek emergency medical attention for worsening symptoms or any new concerns.      I did personally review Page's past medical history, surgical history, social history, as well as family history (when relevant).  In this case, I also oversaw the her drug management by reviewing her medication list, allergy  list, as well as the medications that I prescribed during the UC course and/or recommended as an out-patient (including possible OTC medications such as acetaminophen, NSAIDs , etc).    After reviewing the items above, I did look at previous medical documentation, such as recent hospitalizations, office visits, and/or recent consultations with PCP/specialist.                          SDOH:   Another factor that I considered in Page's care was her Social Determinants of Health (SDOH). During this UC encounter, she did not have social determinants of health. Those SDOH influencing Page's care are: none      Edward Dempsey CNP  [facility]  [phone]

## 2024-07-16 RX ORDER — ENOXAPARIN SODIUM 100 MG/ML
100 INJECTION SUBCUTANEOUS EVERY 12 HOURS
Qty: 30 EACH | Refills: 6 | Status: SHIPPED | OUTPATIENT
Start: 2024-07-16

## 2024-07-28 DIAGNOSIS — J30.89 ENVIRONMENTAL AND SEASONAL ALLERGIES: ICD-10-CM

## 2024-07-30 RX ORDER — FLUTICASONE PROPIONATE 50 MCG
1 SPRAY, SUSPENSION (ML) NASAL 2 TIMES DAILY
Qty: 48 ML | Refills: 1 | Status: SHIPPED | OUTPATIENT
Start: 2024-07-30 | End: 2025-07-30

## 2024-08-02 ENCOUNTER — APPOINTMENT (OUTPATIENT)
Dept: RADIOLOGY | Facility: HOSPITAL | Age: 34
End: 2024-08-02
Payer: COMMERCIAL

## 2024-08-02 ENCOUNTER — HOSPITAL ENCOUNTER (EMERGENCY)
Facility: HOSPITAL | Age: 34
Discharge: HOME | End: 2024-08-02
Payer: COMMERCIAL

## 2024-08-02 VITALS
WEIGHT: 280 LBS | HEIGHT: 63 IN | OXYGEN SATURATION: 98 % | SYSTOLIC BLOOD PRESSURE: 141 MMHG | RESPIRATION RATE: 18 BRPM | DIASTOLIC BLOOD PRESSURE: 85 MMHG | TEMPERATURE: 97.5 F | BODY MASS INDEX: 49.61 KG/M2 | HEART RATE: 101 BPM

## 2024-08-02 DIAGNOSIS — O20.9 VAGINAL BLEEDING AFFECTING EARLY PREGNANCY (HHS-HCC): Primary | ICD-10-CM

## 2024-08-02 DIAGNOSIS — O23.41 UTI (URINARY TRACT INFECTION) DURING PREGNANCY, FIRST TRIMESTER (HHS-HCC): ICD-10-CM

## 2024-08-02 PROBLEM — I82.409 DEEP VEIN THROMBOSIS (DVT) OF LOWER EXTREMITY (MULTI): Status: RESOLVED | Noted: 2022-10-13 | Resolved: 2024-08-02

## 2024-08-02 PROBLEM — K21.9 GASTROESOPHAGEAL REFLUX DISEASE WITHOUT ESOPHAGITIS: Status: RESOLVED | Noted: 2023-09-19 | Resolved: 2024-08-02

## 2024-08-02 PROBLEM — D68.51 FACTOR V LEIDEN (MULTI): Status: RESOLVED | Noted: 2024-08-02 | Resolved: 2024-08-02

## 2024-08-02 LAB
ABO GROUP (TYPE) IN BLOOD: NORMAL
ALBUMIN SERPL BCP-MCNC: 3.8 G/DL (ref 3.4–5)
ALP SERPL-CCNC: 46 U/L (ref 33–110)
ALT SERPL W P-5'-P-CCNC: 26 U/L (ref 7–45)
ANION GAP SERPL CALC-SCNC: 12 MMOL/L (ref 10–20)
ANTIBODY SCREEN: NORMAL
APPEARANCE UR: CLEAR
AST SERPL W P-5'-P-CCNC: 20 U/L (ref 9–39)
B-HCG SERPL-ACNC: ABNORMAL MIU/ML
BACTERIA #/AREA URNS AUTO: ABNORMAL /HPF
BASOPHILS # BLD AUTO: 0.06 X10*3/UL (ref 0–0.1)
BASOPHILS NFR BLD AUTO: 0.6 %
BILIRUB SERPL-MCNC: 0.2 MG/DL (ref 0–1.2)
BILIRUB UR STRIP.AUTO-MCNC: NEGATIVE MG/DL
BUN SERPL-MCNC: 14 MG/DL (ref 6–23)
CALCIUM SERPL-MCNC: 8.8 MG/DL (ref 8.6–10.3)
CHLORIDE SERPL-SCNC: 102 MMOL/L (ref 98–107)
CO2 SERPL-SCNC: 24 MMOL/L (ref 21–32)
COLOR UR: ABNORMAL
CREAT SERPL-MCNC: 0.71 MG/DL (ref 0.5–1.05)
EGFRCR SERPLBLD CKD-EPI 2021: >90 ML/MIN/1.73M*2
EOSINOPHIL # BLD AUTO: 0.11 X10*3/UL (ref 0–0.7)
EOSINOPHIL NFR BLD AUTO: 1.1 %
ERYTHROCYTE [DISTWIDTH] IN BLOOD BY AUTOMATED COUNT: 12.6 % (ref 11.5–14.5)
GLUCOSE SERPL-MCNC: 83 MG/DL (ref 74–99)
GLUCOSE UR STRIP.AUTO-MCNC: NORMAL MG/DL
HCT VFR BLD AUTO: 40.5 % (ref 36–46)
HGB BLD-MCNC: 13.2 G/DL (ref 12–16)
IMM GRANULOCYTES # BLD AUTO: 0.03 X10*3/UL (ref 0–0.7)
IMM GRANULOCYTES NFR BLD AUTO: 0.3 % (ref 0–0.9)
KETONES UR STRIP.AUTO-MCNC: NEGATIVE MG/DL
LEUKOCYTE ESTERASE UR QL STRIP.AUTO: NEGATIVE
LYMPHOCYTES # BLD AUTO: 2.89 X10*3/UL (ref 1.2–4.8)
LYMPHOCYTES NFR BLD AUTO: 28.6 %
MCH RBC QN AUTO: 28.5 PG (ref 26–34)
MCHC RBC AUTO-ENTMCNC: 32.6 G/DL (ref 32–36)
MCV RBC AUTO: 88 FL (ref 80–100)
MONOCYTES # BLD AUTO: 0.76 X10*3/UL (ref 0.1–1)
MONOCYTES NFR BLD AUTO: 7.5 %
NEUTROPHILS # BLD AUTO: 6.25 X10*3/UL (ref 1.2–7.7)
NEUTROPHILS NFR BLD AUTO: 61.9 %
NITRITE UR QL STRIP.AUTO: NEGATIVE
NRBC BLD-RTO: 0 /100 WBCS (ref 0–0)
PH UR STRIP.AUTO: 6 [PH]
PLATELET # BLD AUTO: 254 X10*3/UL (ref 150–450)
POTASSIUM SERPL-SCNC: 3.6 MMOL/L (ref 3.5–5.3)
PROT SERPL-MCNC: 6.8 G/DL (ref 6.4–8.2)
PROT UR STRIP.AUTO-MCNC: NEGATIVE MG/DL
RBC # BLD AUTO: 4.63 X10*6/UL (ref 4–5.2)
RBC # UR STRIP.AUTO: ABNORMAL /UL
RBC #/AREA URNS AUTO: >20 /HPF
RH FACTOR (ANTIGEN D): NORMAL
SODIUM SERPL-SCNC: 134 MMOL/L (ref 136–145)
SP GR UR STRIP.AUTO: 1.01
SQUAMOUS #/AREA URNS AUTO: ABNORMAL /HPF
UROBILINOGEN UR STRIP.AUTO-MCNC: NORMAL MG/DL
WBC # BLD AUTO: 10.1 X10*3/UL (ref 4.4–11.3)
WBC #/AREA URNS AUTO: ABNORMAL /HPF

## 2024-08-02 PROCEDURE — 99284 EMERGENCY DEPT VISIT MOD MDM: CPT | Mod: 25

## 2024-08-02 PROCEDURE — 2500000001 HC RX 250 WO HCPCS SELF ADMINISTERED DRUGS (ALT 637 FOR MEDICARE OP): Performed by: NURSE PRACTITIONER

## 2024-08-02 PROCEDURE — 81001 URINALYSIS AUTO W/SCOPE: CPT | Performed by: NURSE PRACTITIONER

## 2024-08-02 PROCEDURE — 84702 CHORIONIC GONADOTROPIN TEST: CPT | Performed by: NURSE PRACTITIONER

## 2024-08-02 PROCEDURE — 80053 COMPREHEN METABOLIC PANEL: CPT | Performed by: NURSE PRACTITIONER

## 2024-08-02 PROCEDURE — 36415 COLL VENOUS BLD VENIPUNCTURE: CPT | Performed by: NURSE PRACTITIONER

## 2024-08-02 PROCEDURE — 86901 BLOOD TYPING SEROLOGIC RH(D): CPT | Performed by: NURSE PRACTITIONER

## 2024-08-02 PROCEDURE — 76801 OB US < 14 WKS SINGLE FETUS: CPT | Performed by: RADIOLOGY

## 2024-08-02 PROCEDURE — 85025 COMPLETE CBC W/AUTO DIFF WBC: CPT | Performed by: NURSE PRACTITIONER

## 2024-08-02 PROCEDURE — 76817 TRANSVAGINAL US OBSTETRIC: CPT | Performed by: RADIOLOGY

## 2024-08-02 PROCEDURE — 76801 OB US < 14 WKS SINGLE FETUS: CPT

## 2024-08-02 PROCEDURE — 87086 URINE CULTURE/COLONY COUNT: CPT | Mod: SAMLAB | Performed by: NURSE PRACTITIONER

## 2024-08-02 RX ORDER — CEPHALEXIN 500 MG/1
1000 CAPSULE ORAL ONCE
Status: COMPLETED | OUTPATIENT
Start: 2024-08-02 | End: 2024-08-02

## 2024-08-02 RX ORDER — CEPHALEXIN 500 MG/1
500 CAPSULE ORAL 4 TIMES DAILY
Qty: 40 CAPSULE | Refills: 0 | Status: SHIPPED | OUTPATIENT
Start: 2024-08-02 | End: 2024-08-12

## 2024-08-02 RX ADMIN — CEPHALEXIN 1000 MG: 500 CAPSULE ORAL at 21:28

## 2024-08-02 ASSESSMENT — PAIN DESCRIPTION - LOCATION: LOCATION: BACK

## 2024-08-02 ASSESSMENT — PAIN DESCRIPTION - PROGRESSION: CLINICAL_PROGRESSION: GRADUALLY WORSENING

## 2024-08-02 ASSESSMENT — PAIN DESCRIPTION - DESCRIPTORS: DESCRIPTORS: ACHING

## 2024-08-02 ASSESSMENT — PAIN SCALES - GENERAL
PAINLEVEL_OUTOF10: 3
PAINLEVEL_OUTOF10: 0 - NO PAIN

## 2024-08-02 ASSESSMENT — PAIN - FUNCTIONAL ASSESSMENT
PAIN_FUNCTIONAL_ASSESSMENT: 0-10
PAIN_FUNCTIONAL_ASSESSMENT: 0-10

## 2024-08-02 ASSESSMENT — PAIN DESCRIPTION - FREQUENCY: FREQUENCY: INTERMITTENT

## 2024-08-02 ASSESSMENT — PAIN DESCRIPTION - ONSET: ONSET: ONGOING

## 2024-08-02 ASSESSMENT — COLUMBIA-SUICIDE SEVERITY RATING SCALE - C-SSRS
2. HAVE YOU ACTUALLY HAD ANY THOUGHTS OF KILLING YOURSELF?: NO
6. HAVE YOU EVER DONE ANYTHING, STARTED TO DO ANYTHING, OR PREPARED TO DO ANYTHING TO END YOUR LIFE?: NO
1. IN THE PAST MONTH, HAVE YOU WISHED YOU WERE DEAD OR WISHED YOU COULD GO TO SLEEP AND NOT WAKE UP?: NO

## 2024-08-02 ASSESSMENT — PAIN DESCRIPTION - ORIENTATION: ORIENTATION: RIGHT;LEFT

## 2024-08-03 LAB — HOLD SPECIMEN: NORMAL

## 2024-08-03 NOTE — ED PROVIDER NOTES
Chief Complaint   Patient presents with    Female Dysuria    Vaginal Bleeding - Pregnant     Pt to ED with c/o urinary frequency, decreased urinary output, blood in urine, bilateral flank pain x 2 days. Pt is 9 weeks pregnant, now having vaginal bleeding, bright red since 2pm and has changed pad once. Pt has seen an OB at [facility].  A1        Patient History    Past Medical History:   Diagnosis Date    Deep vein thrombosis (DVT) of lower extremity (Multi) 10/13/2022    Factor V Leiden (Multi) 2024    Comment on above: FACTOR V LEIDEN MUTATION, ACTIVATED PROTEIN C      Gastroesophageal reflux disease without esophagitis 2023      No past surgical history on file.   Family History   Problem Relation Name Age of Onset    No Known Problems Mother      Breast cancer Other Grandparent     Other (deep venous thrombosis) Other Grandfather       Social History     Social History Narrative    Not on file      No Known Allergies     PMH: Reviewed  PSH: Reviewed  Social History: Reviewed.   Allergies reviewed.     HPI: Page Gonzalez is a 34 y.o. female who presents to the ED today accompanied by her  with complaints of vaginal bleeding and dysuria.  Patient states she is 9 weeks pregnant.  Follows with [facility], plans to deliver at [facility].  She has had this pregnancy confirmed.  She started having dysuria 2 to 3 days ago.  States she is having some pain in her upper back bilaterally, starting 2 days ago.  Today she noted vaginal bleeding.  Started at 2 PM, has changed her pad 1 time since.  States she had a prior miscarriage in January of this year.  States she was approximately 10 weeks gestation at that time.  States blood type is A positive.      REVIEW OF SYSTEMS:  All other systems reviewed and negative except as listed in HPI.    PHYSICAL EXAM:    GENERAL: Vitals noted, no distress. Alert and oriented x 3. Non-toxic.      EENT: TMs clear. Posterior oropharynx unremarkable. EOMI,  no nystagmus noted.     NECK: Supple. No masses. No midline tenderness. No meningeal signs.     CARDIAC: Regular rate, rhythm. No murmurs rubs or gallops. No JVD.    PULMONARY: Lungs clear and equal bilaterally. No wheezes rales or rhonchi. No respiratory distress.     ABDOMEN: Soft, nondistended, and nontender. No peritoneal signs. Bowel sounds are present and normoactive in all 4 quadrants. No pulsatile masses.     EXTREMITIES: No peripheral edema.     SKIN: No rash. Warm, dry, and intact.     NEURO: No focal neurologic deficits.       Labs Reviewed   COMPREHENSIVE METABOLIC PANEL - Abnormal       Result Value    Glucose 83      Sodium 134 (*)     Potassium 3.6      Chloride 102      Bicarbonate 24      Anion Gap 12      Urea Nitrogen 14      Creatinine 0.71      eGFR >90      Calcium 8.8      Albumin 3.8      Alkaline Phosphatase 46      Total Protein 6.8      AST 20      Bilirubin, Total 0.2      ALT 26     HUMAN CHORIONIC GONADOTROPIN, SERUM QUANTITATIVE - Abnormal    HCG, Beta-Quantitative 52,597 (*)     Narrative:      Total HCG measurement is performed using the [employer] Access   Immunoassay which detects intact HCG and free beta HCG subunit.    This test is not indicated for use as a tumor marker.   HCG testing is performed using a different test methodology at [facility] than other [facility]. Direct result comparison   should only be made within the same method.       URINALYSIS WITH REFLEX CULTURE AND MICROSCOPIC - Abnormal    Color, Urine Light-Yellow      Appearance, Urine Clear      Specific Gravity, Urine 1.014      pH, Urine 6.0      Protein, Urine NEGATIVE      Glucose, Urine Normal      Blood, Urine OVER (3+) (*)     Ketones, Urine NEGATIVE      Bilirubin, Urine NEGATIVE      Urobilinogen, Urine Normal      Nitrite, Urine NEGATIVE      Leukocyte Esterase, Urine NEGATIVE     URINALYSIS MICROSCOPIC WITH REFLEX CULTURE - Abnormal    WBC, Urine 11-20 (*)     RBC, Urine >20  (*)     Squamous Epithelial Cells, Urine 1-9 (SPARSE)      Bacteria, Urine 1+ (*)    URINE CULTURE   CBC WITH AUTO DIFFERENTIAL    WBC 10.1      nRBC 0.0      RBC 4.63      Hemoglobin 13.2      Hematocrit 40.5      MCV 88      MCH 28.5      MCHC 32.6      RDW 12.6      Platelets 254      Neutrophils % 61.9      Immature Granulocytes %, Automated 0.3      Lymphocytes % 28.6      Monocytes % 7.5      Eosinophils % 1.1      Basophils % 0.6      Neutrophils Absolute 6.25      Immature Granulocytes Absolute, Automated 0.03      Lymphocytes Absolute 2.89      Monocytes Absolute 0.76      Eosinophils Absolute 0.11      Basophils Absolute 0.06     TYPE AND SCREEN    ABO TYPE A      Rh TYPE POS      ANTIBODY SCREEN NEG     URINALYSIS WITH REFLEX CULTURE AND MICROSCOPIC    Narrative:     The following orders were created for panel order Urinalysis with Reflex Culture and Microscopic.  Procedure                               Abnormality         Status                     ---------                               -----------         ------                     Urinalysis with Reflex C...[[id number]]  Abnormal            Final result               Extra Urine Gray Tube[[id number]]                            In process                   Please view results for these tests on the individual orders.   EXTRA URINE GRAY TUBE        US PELVIS OB TRANSABDOMINAL W TRANSVAGINAL UP TO 1ST TRIMESTER   Final Result   Intrauterine gestational sac with yolk sac and fetal pole. The   crown-rump length measures 2 cm, which corresponds to 8 week and 4   day gestational age, however no fetal heart tones are detected, and   the findings are highly concerning for fetal demise, and obstetric   consultation/evaluation is recommended.        MACRO:   None        Signed by: Kali Denis 8/2/2024 9:04 PM   Dictation workstation:   [id number]           Medical Decision Making         ED COURSE: This patient was seen and examined by myself independently.   Labs obtained and noted above.  Ultrasound ordered for fetal viability.  Urinalysis shows no leukocytes or nitrites, with 11-20 WBC, >20 RBC, 1+ bacteria. Culture pending. CMP and CBC unremarkable. Blood type A+. Quant HCG 52,597. US pelvis shows Intrauterine gestational sac with yolk sac and fetal pole with no fetal cardiac activity, concerning for fetal demise. Patient and  made aware of the findings. She had natural delivery of prior miscarriage. Recommending OB followup in 1-2 days for repeat assessment and HCG. Will treat urine with keflex, culture pending. Return to the ED for new or worsening symptoms. She is discharged home in a stable condition with computer instructions given and is encouraged to return to the ER for any new or worsening symptoms.              Differential Diagnoses Considered: UTI, pyelonephritis, subchorionic hemorrhage, miscarriage    Chronic Medical Conditions Significantly Affecting Care:     External Records Reviewed: I reviewed recent and relevant outside records including: PCP notes, prior discharge summary, previous radiologic studies    Diagnostic testing considered: blood, urine, US    Escalation of Care: Appropriate for outpatient management    Prescription Drug Consideration: keflex          DIAGNOSTIC IMPRESSION: #1 vaginal bleeding in pregnancy #2 UTI     Tamy Carvajal, CLAUDE-CNP  08/02/24 [id number]

## 2024-08-04 LAB — BACTERIA UR CULT: NORMAL

## 2024-08-05 ENCOUNTER — LAB (OUTPATIENT)
Dept: LAB | Facility: LAB | Age: 34
End: 2024-08-05
Payer: COMMERCIAL

## 2024-08-05 DIAGNOSIS — O02.1 MISSED ABORTION (HHS-HCC): Primary | ICD-10-CM

## 2024-08-05 DIAGNOSIS — O20.9 BLEEDING IN EARLY PREGNANCY (HHS-HCC): ICD-10-CM

## 2024-08-05 LAB
B-HCG SERPL-ACNC: ABNORMAL MIU/ML
BACTERIA UR CULT: NORMAL

## 2024-08-05 PROCEDURE — 36415 COLL VENOUS BLD VENIPUNCTURE: CPT

## 2024-08-05 PROCEDURE — 84702 CHORIONIC GONADOTROPIN TEST: CPT

## 2024-08-06 DIAGNOSIS — D68.51 FACTOR V LEIDEN MUTATION (MULTI): Primary | ICD-10-CM

## 2024-08-12 ENCOUNTER — LAB (OUTPATIENT)
Dept: LAB | Facility: LAB | Age: 34
End: 2024-08-12
Payer: COMMERCIAL

## 2024-08-12 DIAGNOSIS — D68.51 FACTOR V LEIDEN MUTATION (MULTI): ICD-10-CM

## 2024-08-12 DIAGNOSIS — O02.1 MISSED ABORTION (HHS-HCC): ICD-10-CM

## 2024-08-12 LAB — B-HCG SERPL-ACNC: 203 MIU/ML

## 2024-08-12 PROCEDURE — 84702 CHORIONIC GONADOTROPIN TEST: CPT

## 2024-08-12 PROCEDURE — 81240 F2 GENE: CPT

## 2024-08-12 PROCEDURE — 85306 CLOT INHIBIT PROT S FREE: CPT

## 2024-08-12 PROCEDURE — 85302 CLOT INHIBIT PROT C ANTIGEN: CPT

## 2024-08-12 PROCEDURE — 86146 BETA-2 GLYCOPROTEIN ANTIBODY: CPT

## 2024-08-12 PROCEDURE — 86147 CARDIOLIPIN ANTIBODY EA IG: CPT

## 2024-08-12 PROCEDURE — 36415 COLL VENOUS BLD VENIPUNCTURE: CPT

## 2024-08-13 LAB
B2 GLYCOPROT1 IGA SER-ACNC: <0.6 U/ML
B2 GLYCOPROT1 IGG SER-ACNC: <1.4 U/ML
B2 GLYCOPROT1 IGM SER-ACNC: 0.4 U/ML
CARDIOLIPIN IGA SERPL-ACNC: <0.5 APL U/ML
CARDIOLIPIN IGG SER IA-ACNC: <1.6 GPL U/ML
CARDIOLIPIN IGM SER IA-ACNC: 0.2 MPL U/ML
PROT S FREE AG ACT/NOR PPP IA: 78 % (ref 55–124)

## 2024-08-15 LAB — PROT C AG ACT/NOR PPP IA: >95 % (ref 63–153)

## 2024-08-19 ENCOUNTER — APPOINTMENT (OUTPATIENT)
Age: 34
End: 2024-08-19
Payer: COMMERCIAL

## 2024-08-19 ENCOUNTER — OFFICE VISIT (OUTPATIENT)
Dept: HEMATOLOGY/ONCOLOGY | Facility: CLINIC | Age: 34
End: 2024-08-19
Payer: COMMERCIAL

## 2024-08-19 ENCOUNTER — LAB (OUTPATIENT)
Dept: LAB | Facility: LAB | Age: 34
End: 2024-08-19
Payer: COMMERCIAL

## 2024-08-19 VITALS
OXYGEN SATURATION: 95 % | SYSTOLIC BLOOD PRESSURE: 124 MMHG | RESPIRATION RATE: 16 BRPM | TEMPERATURE: 97.2 F | HEART RATE: 94 BPM | WEIGHT: 279.2 LBS | DIASTOLIC BLOOD PRESSURE: 85 MMHG | BODY MASS INDEX: 49.46 KG/M2

## 2024-08-19 VITALS
BODY MASS INDEX: 49.43 KG/M2 | OXYGEN SATURATION: 97 % | DIASTOLIC BLOOD PRESSURE: 78 MMHG | SYSTOLIC BLOOD PRESSURE: 110 MMHG | HEIGHT: 63 IN | WEIGHT: 279 LBS | HEART RATE: 93 BPM

## 2024-08-19 DIAGNOSIS — R03.0 ELEVATED BLOOD PRESSURE READING IN OFFICE WITHOUT DIAGNOSIS OF HYPERTENSION: Primary | ICD-10-CM

## 2024-08-19 DIAGNOSIS — O03.9 MISCARRIAGE AT 8 TO 28 WEEKS GESTATION (HHS-HCC): ICD-10-CM

## 2024-08-19 DIAGNOSIS — D68.51 FACTOR V LEIDEN MUTATION (MULTI): ICD-10-CM

## 2024-08-19 DIAGNOSIS — E66.01 CLASS 3 SEVERE OBESITY DUE TO EXCESS CALORIES WITHOUT SERIOUS COMORBIDITY WITH BODY MASS INDEX (BMI) OF 45.0 TO 49.9 IN ADULT (MULTI): ICD-10-CM

## 2024-08-19 DIAGNOSIS — N96 RECURRENT PREGNANCY LOSS WITHOUT CURRENT PREGNANCY: ICD-10-CM

## 2024-08-19 LAB
B-HCG SERPL-ACNC: 22 MIU/ML
ELECTRONICALLY SIGNED BY: NORMAL
FACTOR II-PROTHROMBIN INTERPRETATION: NORMAL
FACTOR II-PROTHROMBIN RESULT: NORMAL

## 2024-08-19 PROCEDURE — 84702 CHORIONIC GONADOTROPIN TEST: CPT

## 2024-08-19 PROCEDURE — 99213 OFFICE O/P EST LOW 20 MIN: CPT

## 2024-08-19 PROCEDURE — 3008F BODY MASS INDEX DOCD: CPT | Performed by: STUDENT IN AN ORGANIZED HEALTH CARE EDUCATION/TRAINING PROGRAM

## 2024-08-19 PROCEDURE — 1036F TOBACCO NON-USER: CPT | Performed by: STUDENT IN AN ORGANIZED HEALTH CARE EDUCATION/TRAINING PROGRAM

## 2024-08-19 PROCEDURE — 36415 COLL VENOUS BLD VENIPUNCTURE: CPT

## 2024-08-19 PROCEDURE — 99214 OFFICE O/P EST MOD 30 MIN: CPT | Performed by: STUDENT IN AN ORGANIZED HEALTH CARE EDUCATION/TRAINING PROGRAM

## 2024-08-19 ASSESSMENT — COLUMBIA-SUICIDE SEVERITY RATING SCALE - C-SSRS
1. IN THE PAST MONTH, HAVE YOU WISHED YOU WERE DEAD OR WISHED YOU COULD GO TO SLEEP AND NOT WAKE UP?: NO
2. HAVE YOU ACTUALLY HAD ANY THOUGHTS OF KILLING YOURSELF?: NO
6. HAVE YOU EVER DONE ANYTHING, STARTED TO DO ANYTHING, OR PREPARED TO DO ANYTHING TO END YOUR LIFE?: NO

## 2024-08-19 ASSESSMENT — PATIENT HEALTH QUESTIONNAIRE - PHQ9
2. FEELING DOWN, DEPRESSED OR HOPELESS: SEVERAL DAYS
SUM OF ALL RESPONSES TO PHQ9 QUESTIONS 1 AND 2: 2
1. LITTLE INTEREST OR PLEASURE IN DOING THINGS: SEVERAL DAYS
10. IF YOU CHECKED OFF ANY PROBLEMS, HOW DIFFICULT HAVE THESE PROBLEMS MADE IT FOR YOU TO DO YOUR WORK, TAKE CARE OF THINGS AT HOME, OR GET ALONG WITH OTHER PEOPLE: SOMEWHAT DIFFICULT

## 2024-08-19 ASSESSMENT — PAIN SCALES - GENERAL: PAINLEVEL: 0-NO PAIN

## 2024-08-19 NOTE — PROGRESS NOTES
Subjective:  Page Gonzalez is a 34 y.o. female who presents to clinic today for Hypertension      She notes that she has had 2 miscarriages this year which was most recently 2 weeks ago. Her first pregnancy was last November and she had bleeding starting at 10 weeks and had a miscarriage, had a spontaneous . She was pregnancy again in  and had bleeding at 9.5 weeks gestation and had no heartbeat and has had a spontaneous .     She notes that the last 2 times she's been in the office she's been having hypertension.     She has a hx of factor 5 leiden w/ a history of blood clots. She does take eliquis currently.     Weight Loss:  - she's tried exercise changes to help with weight loss, she noted she gained the majority of her weight in college 10 years ago. She's been at this weight for several years.   Review of Systems    Assessment/Plan:  Page Gonzalez is a 34 y.o. female with a history of obesity, factor V Leiden mutation and elevated blood pressure without diagnosis of hypertension who presents to clinic today to address the following issues:   1. Elevated blood pressure reading in office without diagnosis of hypertension  Blood pressure monitor      2. Miscarriage at 8 to 28 weeks gestation ([facility])  HCG, quantitative, pregnancy      3. Recurrent pregnancy loss without current pregnancy        4. Class 3 severe obesity due to excess calories without serious comorbidity with body mass index (BMI) of 45.0 to 49.9 in adult (Multi)        I additionally recommended that Page follow-up with her OB as well as hematologist to discuss recurrent miscarriage and what evaluation as well as management would be needed for future pregnancies.    Problem List Items Addressed This Visit       Obesity    Current Assessment & Plan     - Chronic problem, unresolved, new to this provider, requires further workup and treatment   - Discussed with pt medication options to help with obesity  including GLP-1's we also discussed surgical options  - discussed lifestyle modifications to improve their chronic condition           Elevated blood pressure reading in office without diagnosis of hypertension - Primary    Current Assessment & Plan     Page will check her blood pressure at home several times.  If her blood pressures elevated above 140/90 consistently we will treat.  Upon repeat measurement here in the office it did normalize to 110/78.         Relevant Orders    Blood pressure monitor     Other Visit Diagnoses       Miscarriage at 8 to 28 weeks gestation ([facility])        Relevant Orders    HCG, quantitative, pregnancy    Recurrent pregnancy loss without current pregnancy                Patient Instructions   HOME BLOOD PRESSURE LOG  Instructions:      Measure your blood pressure twice a day--morning and evening--at about the same times every day.  For best results, sit comfortably with both feet on the floor for at least five minutes before taking a measurement.  When you measure your blood pressure, rest your arm on a table so the blood pressure cuff is at about the same height as your heart.  Record your blood pressure on this sheet and show it to your doctor at every visit.    Date Morning Evening             /        /             /        /             /        /             /        /             /        /             /        /             /        /             /        /             /        /             /        /             /        /             /        /             /        /             /        /     For any reading >150/100, sit and rest quietly for 15 minutes and then re-check. If still elevated, please call your provider or go to the emergency room to be evaluated.      Follow up: 3 months for wellness visit    Return precautions discussed.  An After Visit Summary was given to the patient.  All questions were answered and patient in agreement with plan.    Objective:  BP  "110/78   Pulse 93   Ht 1.6 m (5' 3\")   Wt 127 kg (279 lb)   LMP  (LMP Unknown)   SpO2 97%   Breastfeeding Unknown   BMI 49.42 kg/m²     Physical Exam  Vitals and nursing note reviewed.   Constitutional:       General: She is not in acute distress.     Appearance: Normal appearance. She is obese.   HENT:      Head: Normocephalic and atraumatic.      Mouth/Throat:      Mouth: Mucous membranes are moist.   Eyes:      General: No scleral icterus.        Right eye: No discharge.         Left eye: No discharge.      Extraocular Movements: Extraocular movements intact.      Conjunctiva/sclera: Conjunctivae normal.   Cardiovascular:      Rate and Rhythm: Normal rate and regular rhythm.   Pulmonary:      Effort: Pulmonary effort is normal. No respiratory distress.      Breath sounds: Normal breath sounds.   Skin:     General: Skin is warm and dry.   Neurological:      General: No focal deficit present.      Mental Status: She is alert and oriented to person, place, and time.   Psychiatric:         Attention and Perception: Attention normal.         Mood and Affect: Mood normal.         Speech: Speech normal.         Behavior: Behavior normal.         Cognition and Memory: Cognition and memory normal.         Judgment: Judgment normal.         I spent 33 minutes in total time for this visit including all related clinical activities before, during, and after the visit excluding other billable activities/procedure time.     Page Stevens MD    "

## 2024-08-19 NOTE — ASSESSMENT & PLAN NOTE
Page will check her blood pressure at home several times.  If her blood pressures elevated above 140/90 consistently we will treat.  Upon repeat measurement here in the office it did normalize to 110/78.

## 2024-08-19 NOTE — PROGRESS NOTES
Patient ID: Page Gonzalez is a 34 y.o. female.    Subjective    HPI  HPI     32 year old woman with hx of GERD, obesity and DVT referred for evaluation of hx of DVT and heterozygous FVL mutation      she had 2 blood clots 10 years in 2012 and then 6 years ago in 2016   it was a Denominational   DVT in LLE  she saw a hematologist   hypercoagulable testing per patient showed heterozygous FVL mutation   she has been on Eliquis 2.5 mg twice a day , Dr. Ugalde lowered the dose about a year ago, no complications   no new events since 6 years ago      the first blood clot she was on OCP and she was not moving a lot in her work , she completed around 6-8 months of warfarin   the second episode she started having pain behind the knee   she has no major complications with the Eliquis      no abd pain , no nausea or vomiting   she goes to gyn   no fever , infections   no dysuria, no other complaints         interval history - 8/19/24     Patient is in office after recent miscarriage     Energy is low  Stopped Lovenox and restarted Eliquis   Physically healing well  Remains to have mild abdominal pains/cramping   No abnormal bleeding or bruising   Drinking lots of water  Breathing is stable  No vaginal bleeding   No abdominal cramping or pain   No Urinary issues, no dysuria or hematuria   Edema LLE, unchanged,  resolves with elavation   Eating and drinking well  No new concerns          Objective    BSA: There is no height or weight on file to calculate BSA.  LMP  (LMP Unknown)      Physical Exam  Vitals and nursing note reviewed.   Constitutional:       Appearance: Normal appearance.   HENT:      Head: Normocephalic and atraumatic.      Nose: Nose normal.      Mouth/Throat:      Mouth: Mucous membranes are moist.      Pharynx: Oropharynx is clear.   Eyes:      General: No scleral icterus.     Extraocular Movements: Extraocular movements intact.      Conjunctiva/sclera: Conjunctivae normal.   Cardiovascular:      Rate and Rhythm:  Normal rate and regular rhythm.      Pulses: Normal pulses.      Heart sounds: Normal heart sounds.   Pulmonary:      Effort: Pulmonary effort is normal.      Breath sounds: Normal breath sounds.   Abdominal:      Palpations: Abdomen is soft.      Tenderness: There is no abdominal tenderness.   Musculoskeletal:         General: Normal range of motion.      Cervical back: Normal range of motion.   Skin:     General: Skin is warm and dry.   Neurological:      General: No focal deficit present.      Mental Status: She is alert and oriented to person, place, and time.   Psychiatric:         Mood and Affect: Mood normal.         Behavior: Behavior normal.         Thought Content: Thought content normal.         Judgment: Judgment normal.         Performance Status:  Symptomatic; fully ambulatory      Assessment/Plan   1. LLE DVT      no records available but per history the patient has hx of first episode of possible provoked episode in setting of OCP use, s/p about 8 months of warfarin      this was followed by an unprovoked event and since then on Eliquis, we discussed option to continue at 5 mg twice a day or 2.5 mg twice a day and she will continue with same      per patient also her hypercoagulable testing showed heterozygous FVL mutation, will not repeat testing now as it will not       10/19/23- Patient is doing well today. Denies any abnormal bleeding or bruising issues. She denies any clotting concerns, no leg pain, or increased swelling.  She is eating and drinking well. She remains on Eliquis 2.5 mg twice a day, with no issues.   Patient is no longer taking birth control- discussed management in the event she becomes pregnant, she should notify our office immediately to start Lovenox injections during pregnancy through 6 weeks post-partum.      Recent labs are stable, Hg 14.4, Plts 336k, D-dimer 407. No bleeding concerns or new clinical concerns.   Plan is to remain on Eliquis 2.5mg BID, with  continuation of monitoring her labs periodically for bleeding risks     8/19/24- Patient recently miscarried. Discontinued lovenox and restarted oral Eliquis 1 tablet twice daily. Physically healing, symptoms have pretty much resolved occasional mild abdominal cramping/pain. Denies any abnormal bleeding or bruising.    At this time patient is focusing on mental healing. She is unsure when they will start trying to conceive again. They are deciding if they will proceed with further evaluation/testing with OB/Gyn.     Patient was determined previously to be heterozygous for the  FVL mutation. However, a complete hypercoagulation panel was not reported. Further testing was completed and determined the following:    Prothrombin Gene Mutation- no mutation was detected  Factor II-Prothrombin- normal   Antiphospholipid Syndrome- was not detected  Protein S Antigen- was with normal limits  Protein C Antigen- was with normal limits    Further more discussed with patient that the recommendation of starting Lovenox 100mg BID prior to conception due to her high risk factors. Explained to patient that she would anticoagulated with lovenox/heparin through out pregnancy and 6 weeks post-partum.     At this time will return to monitoring patient and labs periodically for bleeding risks.      Plan  Continue on Eliquis 2.5 mg BID  RTC in 6 months with labs prior     (CBC w/dif, CMP)      Patient was instructed to contact our office with any questions or concerns in the interm.             CLAUDE Crisostomo-CNP

## 2024-08-19 NOTE — ASSESSMENT & PLAN NOTE
- Chronic problem, unresolved, new to this provider, requires further workup and treatment   - Discussed with pt medication options to help with obesity including GLP-1's we also discussed surgical options  - discussed lifestyle modifications to improve their chronic condition

## 2024-08-19 NOTE — PROGRESS NOTES
Labs at the [facility] the week of 2/10  Rtc 2/18  3pm  Reviewed AVS with patient- patient verbalizes understanding

## 2024-08-19 NOTE — PATIENT INSTRUCTIONS
HOME BLOOD PRESSURE LOG  Instructions:      Measure your blood pressure twice a day--morning and evening--at about the same times every day.  For best results, sit comfortably with both feet on the floor for at least five minutes before taking a measurement.  When you measure your blood pressure, rest your arm on a table so the blood pressure cuff is at about the same height as your heart.  Record your blood pressure on this sheet and show it to your doctor at every visit.    Date Morning Evening             /        /             /        /             /        /             /        /             /        /             /        /             /        /             /        /             /        /             /        /             /        /             /        /             /        /             /        /     For any reading >150/100, sit and rest quietly for 15 minutes and then re-check. If still elevated, please call your provider or go to the emergency room to be evaluated.

## 2024-08-23 ENCOUNTER — TELEPHONE (OUTPATIENT)
Dept: HEMATOLOGY/ONCOLOGY | Facility: CLINIC | Age: 34
End: 2024-08-23
Payer: COMMERCIAL

## 2024-08-23 DIAGNOSIS — O02.1 MISSED ABORTION (HHS-HCC): Primary | ICD-10-CM

## 2024-09-06 ENCOUNTER — LAB (OUTPATIENT)
Dept: LAB | Facility: LAB | Age: 34
End: 2024-09-06
Payer: COMMERCIAL

## 2024-09-06 DIAGNOSIS — O02.1 MISSED ABORTION (HHS-HCC): ICD-10-CM

## 2024-09-06 LAB — B-HCG SERPL-ACNC: 4 MIU/ML

## 2024-09-06 PROCEDURE — 36415 COLL VENOUS BLD VENIPUNCTURE: CPT

## 2024-09-06 PROCEDURE — 84702 CHORIONIC GONADOTROPIN TEST: CPT

## 2024-09-23 ENCOUNTER — TELEPHONE (OUTPATIENT)
Dept: HEMATOLOGY/ONCOLOGY | Facility: CLINIC | Age: 34
End: 2024-09-23
Payer: COMMERCIAL

## 2024-10-31 ENCOUNTER — APPOINTMENT (OUTPATIENT)
Dept: HEMATOLOGY/ONCOLOGY | Facility: CLINIC | Age: 34
End: 2024-10-31
Payer: COMMERCIAL

## 2024-11-21 ENCOUNTER — APPOINTMENT (OUTPATIENT)
Age: 34
End: 2024-11-21
Payer: COMMERCIAL

## 2024-11-25 ASSESSMENT — PROMIS GLOBAL HEALTH SCALE
CARRYOUT_SOCIAL_ACTIVITIES: GOOD
RATE_PHYSICAL_HEALTH: VERY GOOD
CARRYOUT_PHYSICAL_ACTIVITIES: COMPLETELY
RATE_QUALITY_OF_LIFE: EXCELLENT
EMOTIONAL_PROBLEMS: OFTEN
RATE_AVERAGE_PAIN: 1
RATE_AVERAGE_FATIGUE: SEVERE
RATE_SOCIAL_SATISFACTION: VERY GOOD
RATE_GENERAL_HEALTH: GOOD
RATE_MENTAL_HEALTH: GOOD

## 2024-11-26 ENCOUNTER — APPOINTMENT (OUTPATIENT)
Age: 34
End: 2024-11-26
Payer: COMMERCIAL

## 2024-11-26 VITALS
BODY MASS INDEX: 51.1 KG/M2 | DIASTOLIC BLOOD PRESSURE: 84 MMHG | WEIGHT: 288.4 LBS | SYSTOLIC BLOOD PRESSURE: 118 MMHG | HEIGHT: 63 IN | OXYGEN SATURATION: 94 % | HEART RATE: 118 BPM

## 2024-11-26 DIAGNOSIS — D68.51 FACTOR V LEIDEN MUTATION (MULTI): ICD-10-CM

## 2024-11-26 DIAGNOSIS — R00.0 TACHYCARDIA: ICD-10-CM

## 2024-11-26 DIAGNOSIS — Z00.00 ANNUAL PHYSICAL EXAM: Primary | ICD-10-CM

## 2024-11-26 DIAGNOSIS — F41.9 ANXIETY: ICD-10-CM

## 2024-11-26 DIAGNOSIS — F33.1 MODERATE EPISODE OF RECURRENT MAJOR DEPRESSIVE DISORDER: ICD-10-CM

## 2024-11-26 PROCEDURE — 99214 OFFICE O/P EST MOD 30 MIN: CPT | Performed by: STUDENT IN AN ORGANIZED HEALTH CARE EDUCATION/TRAINING PROGRAM

## 2024-11-26 PROCEDURE — 1036F TOBACCO NON-USER: CPT | Performed by: STUDENT IN AN ORGANIZED HEALTH CARE EDUCATION/TRAINING PROGRAM

## 2024-11-26 PROCEDURE — 3008F BODY MASS INDEX DOCD: CPT | Performed by: STUDENT IN AN ORGANIZED HEALTH CARE EDUCATION/TRAINING PROGRAM

## 2024-11-26 PROCEDURE — 99395 PREV VISIT EST AGE 18-39: CPT | Performed by: STUDENT IN AN ORGANIZED HEALTH CARE EDUCATION/TRAINING PROGRAM

## 2024-11-26 ASSESSMENT — PATIENT HEALTH QUESTIONNAIRE - PHQ9
4. FEELING TIRED OR HAVING LITTLE ENERGY: NEARLY EVERY DAY
2. FEELING DOWN, DEPRESSED OR HOPELESS: NOT AT ALL
5. POOR APPETITE OR OVEREATING: MORE THAN HALF THE DAYS
3. TROUBLE FALLING OR STAYING ASLEEP OR SLEEPING TOO MUCH: SEVERAL DAYS
6. FEELING BAD ABOUT YOURSELF - OR THAT YOU ARE A FAILURE OR HAVE LET YOURSELF OR YOUR FAMILY DOWN: NEARLY EVERY DAY
SUM OF ALL RESPONSES TO PHQ9 QUESTIONS 1 AND 2: 2
7. TROUBLE CONCENTRATING ON THINGS, SUCH AS READING THE NEWSPAPER OR WATCHING TELEVISION: NEARLY EVERY DAY
9. THOUGHTS THAT YOU WOULD BE BETTER OFF DEAD, OR OF HURTING YOURSELF: NOT AT ALL
1. LITTLE INTEREST OR PLEASURE IN DOING THINGS: NOT AT ALL
8. MOVING OR SPEAKING SO SLOWLY THAT OTHER PEOPLE COULD HAVE NOTICED. OR THE OPPOSITE, BEING SO FIGETY OR RESTLESS THAT YOU HAVE BEEN MOVING AROUND A LOT MORE THAN USUAL: NOT AT ALL
SUM OF ALL RESPONSES TO PHQ QUESTIONS 1-9: 14
1. LITTLE INTEREST OR PLEASURE IN DOING THINGS: SEVERAL DAYS
2. FEELING DOWN, DEPRESSED OR HOPELESS: SEVERAL DAYS
SUM OF ALL RESPONSES TO PHQ9 QUESTIONS 1 & 2: 0

## 2024-11-26 ASSESSMENT — ENCOUNTER SYMPTOMS
ABDOMINAL PAIN: 0
DIARRHEA: 0
FEVER: 0
CHILLS: 0
CONSTIPATION: 0
SHORTNESS OF BREATH: 0

## 2024-11-26 NOTE — PROGRESS NOTES
Subjective:  Page Gonzalez is a 34 y.o. female who presents to clinic today for annual exam    Employment:  - What do you do for work or school?  at [employer]  - How is school/work going? Going okay    Social History:  - Who lives with you at home?     Medical History:   (Please complete in history tab)  - Any changes to your personal or family history? no  - Any personal/family history of the following:   Hypertension, heart attack, high cholesterol, stroke  Cancer of the colon, breast, prostate or skin  Addiction, alcoholism, mental health concerns   Diabetes, thyroid or autoimmune disease    PHQ-14  - notes that that she notes some possible depression or anxiety at baseline  - things have been pretty rough since her 2nd miscarriage  - she was on antidepressants once after college, about 10 years ago, was on for a year, this was helpful, was also in counseling    (Provider to ask):    Pillars of health:  - How is your stress level? high Manageable or is it a problem? manageable  - Any guns in the home? yes If yes, are they locked in a safe? no      Sexual history (provider to ask):  - Have you been sexually active within the past year? yes  - What are the genders of your sexual partner(s)? male  - Are you or your spouse/partner wanting to become pregnant or have children in the next year?  unsure  - If no, are you currently using any method to prevent pregnancy:? none  - Do you have a personal history of sexually transmitted infections (STI)?: no  - Have you ever been tested for HIV? yes  - Do you want comprehensive STI testing today? no    Domestic Violence Screening (Provider to ask):  Because violence is so common in many people's lives and because there is help available for those being abused, I now ask every patient about domestic violence:  - Within the past year have you been hit, slapped, kicked or otherwise physically hurt by someone? no  - Are you in a relationship with  a person who threatens or physically hurts you? \no  - Has anyone forced you to have sexual activities that made you feel uncomfortable? No    Problem based visit:  Abdominal Discomfort:  - notes that she gets nausea nearly every time that she eats  - feels like she had GERD, initially felt like she was having bad back pain, this has happened 3-4x yearly and it happened at a work event  -  PHQ9-14  - notes that that she notes some possible depression or anxiety at baseline  - things have been pretty rough since her 2nd miscarriage  - she was on antidepressants once after college, about 10 years ago, was on for a year, this was helpful, was also in counseling    Review of Systems   Constitutional:  Negative for chills and fever.   Respiratory:  Negative for shortness of breath.    Cardiovascular:  Negative for chest pain.   Gastrointestinal:  Negative for abdominal pain, constipation and diarrhea.       Assessment/Plan:  Page Gonzalez is a 34 y.o. female with a history of 5 Leiden, obesity, recurrent miscarriage who presents to clinic today to address the following issues:   1. Annual physical exam        2. Factor V Leiden mutation (Multi)  apixaban (Eliquis) 2.5 mg tablet      3. Tachycardia  Holter or Event Cardiac Monitor        Annual physical exam within normal limits.  Patient up-to-date on healthcare maintenance with exception of vaccinations.  Refilled her Eliquis for her factor V Leiden.    Depression/anxiety:   - Chronic problem, unresolved, new to this provider, requires further workup and treatment   - Discussed with pt that I would recommend starting SSRI at this time she is not currently interested but is going to consider    Tachycardia:  Problem of unknown length.  Patient heart is in regular today.  She is completely asymptomatic and was anxious on examination.  Due to this we will allow her to monitor her heart rate at home for 1 week.  If persistently tachycardic we will have her do  "48-hour Holter monitor.  If resolved will continue to monitor.  She did recently have TSH which was within normal limits as well as recent CHEM panel.    Follow up: 1 year or sooner as needed    Return precautions discussed.  An After Visit Summary was given to the patient.  All questions were answered and patient in agreement with plan.    Objective:  /84   Pulse (!) 118   Ht 1.6 m (5' 3\")   Wt 131 kg (288 lb 6.4 oz)   SpO2 94%   BMI 51.09 kg/m²     Physical Exam  Vitals and nursing note reviewed.   Constitutional:       General: She is not in acute distress.     Appearance: She is obese.   HENT:      Head: Normocephalic and atraumatic.      Right Ear: Tympanic membrane, ear canal and external ear normal. There is no impacted cerumen.      Left Ear: Tympanic membrane, ear canal and external ear normal. There is no impacted cerumen.      Mouth/Throat:      Mouth: Mucous membranes are moist.      Pharynx: No oropharyngeal exudate or posterior oropharyngeal erythema.   Eyes:      General: No scleral icterus.        Right eye: No discharge.         Left eye: No discharge.      Extraocular Movements: Extraocular movements intact.      Conjunctiva/sclera: Conjunctivae normal.      Pupils: Pupils are equal, round, and reactive to light.   Cardiovascular:      Rate and Rhythm: Regular rhythm. Tachycardia present.   Pulmonary:      Effort: Pulmonary effort is normal. No respiratory distress.      Breath sounds: Normal breath sounds.   Abdominal:      General: Abdomen is flat. There is no distension.   Musculoskeletal:      Cervical back: Normal range of motion. No tenderness.      Right lower leg: No edema.      Left lower leg: No edema.   Lymphadenopathy:      Cervical: No cervical adenopathy.   Skin:     General: Skin is warm and dry.   Neurological:      General: No focal deficit present.      Mental Status: She is alert and oriented to person, place, and time.      Deep Tendon Reflexes: Reflexes normal. "   Psychiatric:         Mood and Affect: Mood normal. Affect is tearful.         Behavior: Behavior normal.         Thought Content: Thought content normal.         Judgment: Judgment normal.         LABS:   Lab Results   Component Value Date    HGBA1C 5.1 09/13/2024       The ASCVD Risk score (Bon [place], et al., 2019) failed to calculate for the following reasons:    The 2019 ASCVD risk score is only valid for ages 40 to 79    IMAGES:   No new images     I spent 55 minutes in total time for this visit including all related clinical activities before, during, and after the visit excluding other billable activities/procedure time.     Page Stevens MD

## 2024-11-29 ENCOUNTER — HOSPITAL ENCOUNTER (OUTPATIENT)
Dept: CARDIOLOGY | Facility: HOSPITAL | Age: 34
Discharge: HOME | End: 2024-11-29
Payer: COMMERCIAL

## 2024-11-29 DIAGNOSIS — R00.0 TACHYCARDIA: ICD-10-CM

## 2024-11-29 PROCEDURE — 93225 XTRNL ECG REC<48 HRS REC: CPT

## 2025-02-10 ENCOUNTER — LAB (OUTPATIENT)
Dept: LAB | Facility: HOSPITAL | Age: 35
End: 2025-02-10
Payer: COMMERCIAL

## 2025-02-10 DIAGNOSIS — D68.51 FACTOR V LEIDEN MUTATION (MULTI): ICD-10-CM

## 2025-02-10 LAB
ALBUMIN SERPL BCP-MCNC: 4.2 G/DL (ref 3.4–5)
ALP SERPL-CCNC: 51 U/L (ref 33–110)
ALT SERPL W P-5'-P-CCNC: 13 U/L (ref 7–45)
ANION GAP SERPL CALC-SCNC: 11 MMOL/L (ref 10–20)
AST SERPL W P-5'-P-CCNC: 16 U/L (ref 9–39)
BASOPHILS # BLD AUTO: 0.09 X10*3/UL (ref 0–0.1)
BASOPHILS NFR BLD AUTO: 0.8 %
BILIRUB SERPL-MCNC: 0.2 MG/DL (ref 0–1.2)
BUN SERPL-MCNC: 17 MG/DL (ref 6–23)
CALCIUM SERPL-MCNC: 8.8 MG/DL (ref 8.6–10.3)
CHLORIDE SERPL-SCNC: 102 MMOL/L (ref 98–107)
CO2 SERPL-SCNC: 28 MMOL/L (ref 21–32)
CREAT SERPL-MCNC: 0.85 MG/DL (ref 0.5–1.05)
EGFRCR SERPLBLD CKD-EPI 2021: >90 ML/MIN/1.73M*2
EOSINOPHIL # BLD AUTO: 0.18 X10*3/UL (ref 0–0.7)
EOSINOPHIL NFR BLD AUTO: 1.7 %
ERYTHROCYTE [DISTWIDTH] IN BLOOD BY AUTOMATED COUNT: 12.6 % (ref 11.5–14.5)
GLUCOSE SERPL-MCNC: 86 MG/DL (ref 74–99)
HCT VFR BLD AUTO: 42.8 % (ref 36–46)
HGB BLD-MCNC: 13.7 G/DL (ref 12–16)
IMM GRANULOCYTES # BLD AUTO: 0.02 X10*3/UL (ref 0–0.7)
IMM GRANULOCYTES NFR BLD AUTO: 0.2 % (ref 0–0.9)
LYMPHOCYTES # BLD AUTO: 3.87 X10*3/UL (ref 1.2–4.8)
LYMPHOCYTES NFR BLD AUTO: 35.8 %
MCH RBC QN AUTO: 27.5 PG (ref 26–34)
MCHC RBC AUTO-ENTMCNC: 32 G/DL (ref 32–36)
MCV RBC AUTO: 86 FL (ref 80–100)
MONOCYTES # BLD AUTO: 0.66 X10*3/UL (ref 0.1–1)
MONOCYTES NFR BLD AUTO: 6.1 %
NEUTROPHILS # BLD AUTO: 6 X10*3/UL (ref 1.2–7.7)
NEUTROPHILS NFR BLD AUTO: 55.4 %
NRBC BLD-RTO: 0 /100 WBCS (ref 0–0)
PLATELET # BLD AUTO: 338 X10*3/UL (ref 150–450)
POTASSIUM SERPL-SCNC: 4.1 MMOL/L (ref 3.5–5.3)
PROT SERPL-MCNC: 7.3 G/DL (ref 6.4–8.2)
RBC # BLD AUTO: 4.99 X10*6/UL (ref 4–5.2)
SODIUM SERPL-SCNC: 137 MMOL/L (ref 136–145)
WBC # BLD AUTO: 10.8 X10*3/UL (ref 4.4–11.3)

## 2025-02-10 PROCEDURE — 80053 COMPREHEN METABOLIC PANEL: CPT

## 2025-02-10 PROCEDURE — 85025 COMPLETE CBC W/AUTO DIFF WBC: CPT

## 2025-02-18 ENCOUNTER — TELEMEDICINE (OUTPATIENT)
Dept: HEMATOLOGY/ONCOLOGY | Facility: CLINIC | Age: 35
End: 2025-02-18
Payer: COMMERCIAL

## 2025-02-18 DIAGNOSIS — D68.51 FACTOR V LEIDEN MUTATION (MULTI): ICD-10-CM

## 2025-02-18 NOTE — PROGRESS NOTES
Pt had a phone visit with CNP today  I called pt back with followup- I had to leave a vm  I reminded her to stay on the Eliquis twice daily  Labs at the [facility] the week prior of her appt  Rtc 8/18 3pm for CNP visit  Reviewed AVS with patient- patient verbalizes understanding

## 2025-02-18 NOTE — PATIENT INSTRUCTIONS
Discussed and reviewed medical history and labs  Encouraged to continue on Eliquis 2.5mg twice daily  Return in 6 months with labs prior (CBC w/diff, CMP)

## 2025-02-18 NOTE — PROGRESS NOTES
Patient ID: Page Gonzalez is a 35 y.o. female.    Verbal consent was requested and obtained from patient on this date for a telehealth visit.    Subjective      HPI     32 year old woman with hx of GERD, obesity and DVT referred for evaluation of hx of DVT and heterozygous FVL mutation      she had 2 blood clots 10 years in 2012 and then 6 years ago in 2016   it was a Hindu   DVT in LLE  she saw a hematologist   hypercoagulable testing per patient showed heterozygous FVL mutation   she has been on Eliquis 2.5 mg twice a day , Dr. Ugalde lowered the dose about a year ago, no complications   no new events since 6 years ago      the first blood clot she was on OCP and she was not moving a lot in her work , she completed around 6-8 months of warfarin   the second episode she started having pain behind the knee   she has no major complications with the Eliquis      no abd pain , no nausea or vomiting   she goes to gyn   no fever , infections   no dysuria, no other complaints         interval history - 2/18/25    Recently seen by GYN- reports having Uterine Septum  She has been referred for possible surgical treatment     Energy is low  Eating and drinking well   Continues on Eliquis 2.5mg    Reports has been more physically active lately   Denies any abnormal bleeding or bruising   Drinking lots of water  Breathing is stable  No vaginal bleeding   No abdominal cramping or pain   No Urinary issues, no dysuria or hematuria   Edema LLE, has been slightly improved lately   No new concerns     Objective    BSA: There is no height or weight on file to calculate BSA.  There were no vitals taken for this visit.     Physical Exam  HENT:      Mouth/Throat:      Pharynx: Oropharynx is clear.   Pulmonary:      Effort: Pulmonary effort is normal.   Neurological:      General: No focal deficit present.      Mental Status: She is alert and oriented to person, place, and time.   Psychiatric:         Mood and Affect: Mood normal.          Behavior: Behavior normal.         Thought Content: Thought content normal.         Judgment: Judgment normal.         Performance Status:  Asymptomatic      Assessment/Plan   1. LLE DVT      no records available but per history the patient has hx of first episode of possible provoked episode in setting of OCP use, s/p about 8 months of warfarin      this was followed by an unprovoked event and since then on Eliquis, we discussed option to continue at 5 mg twice a day or 2.5 mg twice a day and she will continue with same      per patient also her hypercoagulable testing showed heterozygous FVL mutation, will not repeat testing now as it will not       10/19/23- Patient is doing well today. Denies any abnormal bleeding or bruising issues. She denies any clotting concerns, no leg pain, or increased swelling.  She is eating and drinking well. She remains on Eliquis 2.5 mg twice a day, with no issues.   Patient is no longer taking birth control- discussed management in the event she becomes pregnant, she should notify our office immediately to start Lovenox injections during pregnancy through 6 weeks post-partum.      Recent labs are stable, Hg 14.4, Plts 336k, D-dimer 407. No bleeding concerns or new clinical concerns.   Plan is to remain on Eliquis 2.5mg BID, with continuation of monitoring her labs periodically for bleeding risks     8/19/24- Patient recently miscarried. Discontinued lovenox and restarted oral Eliquis 1 tablet twice daily. Physically healing, symptoms have pretty much resolved occasional mild abdominal cramping/pain. Denies any abnormal bleeding or bruising.     At this time patient is focusing on mental healing. She is unsure when they will start trying to conceive again. They are deciding if they will proceed with further evaluation/testing with OB/Gyn.      Patient was determined previously to be heterozygous for the  FVL mutation. However, a complete hypercoagulation panel  was not reported. Further testing was completed and determined the following:     Prothrombin Gene Mutation- no mutation was detected  Factor II-Prothrombin- normal   Antiphospholipid Syndrome- was not detected  Protein S Antigen- was with normal limits  Protein C Antigen- was with normal limits     Further more discussed with patient that the recommendation of starting Lovenox 100mg BID prior to conception due to her high risk factors. Explained to patient that she would anticoagulated with lovenox/heparin through out pregnancy and 6 weeks post-partum.      At this time will return to monitoring patient and labs periodically for bleeding risks.     2/18/25: Patient's labs remain stable. No evidence of bleeding. Patient overall feels well. Denies any abnormal bleeding or bruising. No B symptoms. The plan will remain to continue on Eliquis 2.5mg daily.     Patient is scheduled for consultation regarding surgical procedure to remove a uterine septum. Discussed discontinuation of anticoagulation of 48-72 hours prior to procedure and resuming as instructed by surgeon.      Plan  Discussed and reviewed medical history and labs  Encouraged to continue on Eliquis 2.5mg twice daily  Return in 6 months with labs prior (CBC w/diff, CMP)        Patient was instructed to contact our office with any questions or concerns in the interm.        I performed this visit using real-time telehealth tools, including an audio/video connection between Page Gonzalez, [place], [place] and CLAUDE Mullins- practicing provider in [place], [place].         CLAUDE Crisostomo-CNP

## 2025-02-27 ENCOUNTER — APPOINTMENT (OUTPATIENT)
Age: 35
End: 2025-02-27
Payer: COMMERCIAL

## 2025-02-27 VITALS
HEIGHT: 63 IN | BODY MASS INDEX: 51.14 KG/M2 | DIASTOLIC BLOOD PRESSURE: 90 MMHG | SYSTOLIC BLOOD PRESSURE: 134 MMHG | HEART RATE: 93 BPM | WEIGHT: 288.6 LBS | OXYGEN SATURATION: 99 %

## 2025-02-27 DIAGNOSIS — F32.1 CURRENT MODERATE EPISODE OF MAJOR DEPRESSIVE DISORDER WITHOUT PRIOR EPISODE (MULTI): ICD-10-CM

## 2025-02-27 DIAGNOSIS — K21.9 GASTROESOPHAGEAL REFLUX DISEASE WITHOUT ESOPHAGITIS: ICD-10-CM

## 2025-02-27 DIAGNOSIS — F41.9 ANXIETY: Primary | ICD-10-CM

## 2025-02-27 PROCEDURE — 3008F BODY MASS INDEX DOCD: CPT | Performed by: STUDENT IN AN ORGANIZED HEALTH CARE EDUCATION/TRAINING PROGRAM

## 2025-02-27 PROCEDURE — 1036F TOBACCO NON-USER: CPT | Performed by: STUDENT IN AN ORGANIZED HEALTH CARE EDUCATION/TRAINING PROGRAM

## 2025-02-27 PROCEDURE — 99214 OFFICE O/P EST MOD 30 MIN: CPT | Performed by: STUDENT IN AN ORGANIZED HEALTH CARE EDUCATION/TRAINING PROGRAM

## 2025-02-27 RX ORDER — FAMOTIDINE 10 MG/1
10 TABLET ORAL DAILY
COMMUNITY

## 2025-02-27 RX ORDER — FLUOXETINE HYDROCHLORIDE 20 MG/1
CAPSULE ORAL
Qty: 106 CAPSULE | Refills: 0 | Status: SHIPPED | OUTPATIENT
Start: 2025-02-27 | End: 2025-04-28

## 2025-02-27 RX ORDER — PANTOPRAZOLE SODIUM 40 MG/1
40 TABLET, DELAYED RELEASE ORAL DAILY
Qty: 14 TABLET | Refills: 0 | Status: SHIPPED | OUTPATIENT
Start: 2025-02-27 | End: 2025-04-28

## 2025-02-27 ASSESSMENT — PATIENT HEALTH QUESTIONNAIRE - PHQ9
SUM OF ALL RESPONSES TO PHQ9 QUESTIONS 1 & 2: 4
10. IF YOU CHECKED OFF ANY PROBLEMS, HOW DIFFICULT HAVE THESE PROBLEMS MADE IT FOR YOU TO DO YOUR WORK, TAKE CARE OF THINGS AT HOME, OR GET ALONG WITH OTHER PEOPLE: VERY DIFFICULT
5. POOR APPETITE OR OVEREATING: SEVERAL DAYS
9. THOUGHTS THAT YOU WOULD BE BETTER OFF DEAD, OR OF HURTING YOURSELF: NOT AT ALL
6. FEELING BAD ABOUT YOURSELF - OR THAT YOU ARE A FAILURE OR HAVE LET YOURSELF OR YOUR FAMILY DOWN: SEVERAL DAYS
1. LITTLE INTEREST OR PLEASURE IN DOING THINGS: MORE THAN HALF THE DAYS
4. FEELING TIRED OR HAVING LITTLE ENERGY: NEARLY EVERY DAY
8. MOVING OR SPEAKING SO SLOWLY THAT OTHER PEOPLE COULD HAVE NOTICED. OR THE OPPOSITE, BEING SO FIGETY OR RESTLESS THAT YOU HAVE BEEN MOVING AROUND A LOT MORE THAN USUAL: NOT AT ALL
3. TROUBLE FALLING OR STAYING ASLEEP: SEVERAL DAYS
7. TROUBLE CONCENTRATING ON THINGS, SUCH AS READING THE NEWSPAPER OR WATCHING TELEVISION: NEARLY EVERY DAY
SUM OF ALL RESPONSES TO PHQ QUESTIONS 1-9: 13
2. FEELING DOWN, DEPRESSED OR HOPELESS: MORE THAN HALF THE DAYS

## 2025-02-27 ASSESSMENT — ANXIETY QUESTIONNAIRES
4. TROUBLE RELAXING: NEARLY EVERY DAY
6. BECOMING EASILY ANNOYED OR IRRITABLE: SEVERAL DAYS
7. FEELING AFRAID AS IF SOMETHING AWFUL MIGHT HAPPEN: MORE THAN HALF THE DAYS
5. BEING SO RESTLESS THAT IT IS HARD TO SIT STILL: NOT AT ALL
3. WORRYING TOO MUCH ABOUT DIFFERENT THINGS: NEARLY EVERY DAY
1. FEELING NERVOUS, ANXIOUS, OR ON EDGE: NEARLY EVERY DAY
2. NOT BEING ABLE TO STOP OR CONTROL WORRYING: NEARLY EVERY DAY
IF YOU CHECKED OFF ANY PROBLEMS ON THIS QUESTIONNAIRE, HOW DIFFICULT HAVE THESE PROBLEMS MADE IT FOR YOU TO DO YOUR WORK, TAKE CARE OF THINGS AT HOME, OR GET ALONG WITH OTHER PEOPLE: VERY DIFFICULT
GAD7 TOTAL SCORE: 15

## 2025-02-27 NOTE — PATIENT INSTRUCTIONS
Depression:  START prozac 20mg daily for 2 weeks, INCrease to 40mg daily after that    Follow up with Dr. Stevens in 6weeks.

## 2025-02-27 NOTE — PROGRESS NOTES
Subjective:  Page Gonzalez is a 35 y.o. female who presents to clinic today for Follow-up (Discuss Antidepressant, Acid reflux)      She has a lot on her plate lately including the loss of her house due to landlord selling it.  Going to see a new home tonight.  She has to move in the next 4 weeks.    She's been experiencing significant anxiety, very worried all of the time, crying multiple times a day. She is apathetic at work, she's been late to work which is not consistent for her at all. She has been forgetful and has startled easily. She's been on high alert. She's been irritable.    PHQ9-13  GAD7- 15    Review of Systems    Assessment/Plan:  Page Gonzalez is a 35 y.o. female who presents to clinic today to address the following issues:   1. Anxiety  FLUoxetine (PROzac) 20 mg capsule      2. Current moderate episode of major depressive disorder without prior episode (Multi)  FLUoxetine (PROzac) 20 mg capsule      3. Gastroesophageal reflux disease without esophagitis  pantoprazole (ProtoNix) 40 mg EC tablet        Anxiety/Depression:  - Chronic problem, unresolved, new to this provider, requires further workup and treatment   - Discussed with pt   START prozac 20mg daily for 2 weeks, INCrease to 40mg daily after that    GERD:  - will do 2 week course of pantoprazole to help with symptoms  Problem List Items Addressed This Visit    None  Visit Diagnoses       Anxiety    -  Primary    Relevant Medications    FLUoxetine (PROzac) 20 mg capsule    Current moderate episode of major depressive disorder without prior episode (Multi)        Relevant Medications    FLUoxetine (PROzac) 20 mg capsule    Gastroesophageal reflux disease without esophagitis        Relevant Medications    pantoprazole (ProtoNix) 40 mg EC tablet            Patient Instructions   Depression:  START prozac 20mg daily for 2 weeks, INCrease to 40mg daily after that    Follow up with Dr. Stevens in 6weeks.     Follow up: 6  "weeks    Return precautions discussed.  An After Visit Summary was given to the patient.  All questions were answered and patient in agreement with plan.    Objective:  /90   Pulse 93   Ht 1.6 m (5' 3\")   Wt 131 kg (288 lb 9.6 oz)   SpO2 99%   BMI 51.12 kg/m²     Physical Exam  Vitals and nursing note reviewed.   Constitutional:       General: She is not in acute distress.     Appearance: Normal appearance. She is obese.   HENT:      Head: Normocephalic and atraumatic.      Mouth/Throat:      Mouth: Mucous membranes are moist.   Eyes:      General: No scleral icterus.        Right eye: No discharge.         Left eye: No discharge.      Extraocular Movements: Extraocular movements intact.      Conjunctiva/sclera: Conjunctivae normal.   Pulmonary:      Effort: No respiratory distress.   Skin:     General: Skin is warm and dry.   Neurological:      General: No focal deficit present.      Mental Status: She is alert and oriented to person, place, and time.   Psychiatric:         Attention and Perception: Attention normal.         Mood and Affect: Mood is anxious. Affect is labile and tearful.         Speech: Speech normal.         Behavior: Behavior normal.         Cognition and Memory: Cognition and memory normal.         Judgment: Judgment normal.         I spent 18 minutes in total time for this visit including all related clinical activities before, during, and after the visit excluding other billable activities/procedure time.     Page Stevens MD    "

## 2025-03-24 DIAGNOSIS — J30.89 ENVIRONMENTAL AND SEASONAL ALLERGIES: ICD-10-CM

## 2025-03-24 RX ORDER — FLUTICASONE PROPIONATE 50 MCG
1 SPRAY, SUSPENSION (ML) NASAL 2 TIMES DAILY
Qty: 48 ML | Refills: 1 | OUTPATIENT
Start: 2025-03-24 | End: 2026-03-24

## 2025-04-17 ENCOUNTER — APPOINTMENT (OUTPATIENT)
Age: 35
End: 2025-04-17
Payer: COMMERCIAL

## 2025-04-17 VITALS
WEIGHT: 273.6 LBS | HEART RATE: 96 BPM | SYSTOLIC BLOOD PRESSURE: 108 MMHG | OXYGEN SATURATION: 97 % | BODY MASS INDEX: 48.48 KG/M2 | DIASTOLIC BLOOD PRESSURE: 82 MMHG | HEIGHT: 63 IN

## 2025-04-17 DIAGNOSIS — F32.1 CURRENT MODERATE EPISODE OF MAJOR DEPRESSIVE DISORDER WITHOUT PRIOR EPISODE (MULTI): ICD-10-CM

## 2025-04-17 DIAGNOSIS — F41.9 ANXIETY: ICD-10-CM

## 2025-04-17 PROCEDURE — 99214 OFFICE O/P EST MOD 30 MIN: CPT | Performed by: STUDENT IN AN ORGANIZED HEALTH CARE EDUCATION/TRAINING PROGRAM

## 2025-04-17 PROCEDURE — 3008F BODY MASS INDEX DOCD: CPT | Performed by: STUDENT IN AN ORGANIZED HEALTH CARE EDUCATION/TRAINING PROGRAM

## 2025-04-17 PROCEDURE — 1036F TOBACCO NON-USER: CPT | Performed by: STUDENT IN AN ORGANIZED HEALTH CARE EDUCATION/TRAINING PROGRAM

## 2025-04-17 RX ORDER — FLUOXETINE HYDROCHLORIDE 40 MG/1
40 CAPSULE ORAL DAILY
Qty: 90 CAPSULE | Refills: 3 | Status: SHIPPED | OUTPATIENT
Start: 2025-04-17

## 2025-04-17 NOTE — PROGRESS NOTES
"Subjective:  Page Gonzalez is a 35 y.o. female who presents to clinic today for Follow-up      Page comes for 6-week follow-up mood.  She recently started Prozac and she is doing okay. She notes that she's feeling a bit better. Less crying. Still having some concentration and sleep difficulties. Less irritable and brain fog. She is therapy and her therapist is helpful.     PHQ9- 9    Review of Systems    Assessment/Plan:  Page Gonzalez is a 35 y.o. female  who presents to clinic today to address the following issues:   1. Anxiety  FLUoxetine (PROzac) 40 mg capsule      2. Current moderate episode of major depressive disorder without prior episode (Multi)  FLUoxetine (PROzac) 40 mg capsule        I had a discussion with page regarding her symptoms and we decided with shared decision making to leave Prozac at 40 mg daily.  I did review her note from her reproductive doctor including the recommendation for SIS.  We discussed that and she still really considering is unsure if she is going to move forward with.  We discussed return precautions and she is going to contact us if she feels like patient make a dose adjustment.  Will have follow-up in 4 months.  Problem List Items Addressed This Visit    None  Visit Diagnoses         Anxiety        Relevant Medications    FLUoxetine (PROzac) 40 mg capsule      Current moderate episode of major depressive disorder without prior episode (Multi)        Relevant Medications    FLUoxetine (PROzac) 40 mg capsule            There are no Patient Instructions on file for this visit.    Return precautions discussed.  An After Visit Summary was given to the patient.  All questions were answered and patient in agreement with plan.    Objective:  /82   Pulse 96   Ht 1.6 m (5' 3\")   Wt 124 kg (273 lb 9.6 oz)   SpO2 97%   BMI 48.47 kg/m²     Physical Exam  Vitals and nursing note reviewed.   Constitutional:       General: She is not in acute distress.     " Appearance: Normal appearance. She is obese.   HENT:      Head: Normocephalic and atraumatic.      Mouth/Throat:      Mouth: Mucous membranes are moist.   Eyes:      General: No scleral icterus.        Right eye: No discharge.         Left eye: No discharge.      Extraocular Movements: Extraocular movements intact.      Conjunctiva/sclera: Conjunctivae normal.   Pulmonary:      Effort: No respiratory distress.   Skin:     General: Skin is warm and dry.   Neurological:      General: No focal deficit present.      Mental Status: She is alert and oriented to person, place, and time.   Psychiatric:         Attention and Perception: Attention normal.         Mood and Affect: Mood normal.         Speech: Speech normal.         Behavior: Behavior normal.         Cognition and Memory: Cognition and memory normal.         Judgment: Judgment normal.         I spent 19 minutes in total time for this visit including all related clinical activities before, during, and after the visit excluding other billable activities/procedure time.     Page Stevens MD

## 2025-04-27 DIAGNOSIS — J30.89 ENVIRONMENTAL AND SEASONAL ALLERGIES: ICD-10-CM

## 2025-04-28 RX ORDER — FLUTICASONE PROPIONATE 50 MCG
1 SPRAY, SUSPENSION (ML) NASAL 2 TIMES DAILY
Qty: 48 ML | Refills: 1 | OUTPATIENT
Start: 2025-04-28 | End: 2026-04-28

## 2025-08-07 DIAGNOSIS — D68.51 FACTOR V LEIDEN MUTATION (MULTI): ICD-10-CM

## 2025-08-18 ENCOUNTER — LAB (OUTPATIENT)
Dept: LAB | Facility: HOSPITAL | Age: 35
End: 2025-08-18
Payer: COMMERCIAL

## 2025-08-18 ENCOUNTER — APPOINTMENT (OUTPATIENT)
Dept: HEMATOLOGY/ONCOLOGY | Facility: CLINIC | Age: 35
End: 2025-08-18
Payer: COMMERCIAL

## 2025-08-18 DIAGNOSIS — D68.51 FACTOR V LEIDEN MUTATION (MULTI): ICD-10-CM

## 2025-08-18 LAB
ALBUMIN SERPL BCP-MCNC: 4.2 G/DL (ref 3.4–5)
ALP SERPL-CCNC: 60 U/L (ref 33–110)
ALT SERPL W P-5'-P-CCNC: 16 U/L (ref 7–45)
ANION GAP SERPL CALC-SCNC: 11 MMOL/L (ref 10–20)
AST SERPL W P-5'-P-CCNC: 18 U/L (ref 9–39)
BASOPHILS # BLD AUTO: 0.08 X10*3/UL (ref 0–0.1)
BASOPHILS NFR BLD AUTO: 0.8 %
BILIRUB SERPL-MCNC: 0.3 MG/DL (ref 0–1.2)
BUN SERPL-MCNC: 9 MG/DL (ref 6–23)
CALCIUM SERPL-MCNC: 9.2 MG/DL (ref 8.6–10.3)
CHLORIDE SERPL-SCNC: 102 MMOL/L (ref 98–107)
CO2 SERPL-SCNC: 29 MMOL/L (ref 21–32)
CREAT SERPL-MCNC: 0.87 MG/DL (ref 0.5–1.05)
EGFRCR SERPLBLD CKD-EPI 2021: 89 ML/MIN/1.73M*2
EOSINOPHIL # BLD AUTO: 0.13 X10*3/UL (ref 0–0.7)
EOSINOPHIL NFR BLD AUTO: 1.3 %
ERYTHROCYTE [DISTWIDTH] IN BLOOD BY AUTOMATED COUNT: 12.6 % (ref 11.5–14.5)
GLUCOSE SERPL-MCNC: 126 MG/DL (ref 74–99)
HCT VFR BLD AUTO: 42.1 % (ref 36–46)
HGB BLD-MCNC: 13.6 G/DL (ref 12–16)
IMM GRANULOCYTES # BLD AUTO: 0.04 X10*3/UL (ref 0–0.7)
IMM GRANULOCYTES NFR BLD AUTO: 0.4 % (ref 0–0.9)
LYMPHOCYTES # BLD AUTO: 2.45 X10*3/UL (ref 1.2–4.8)
LYMPHOCYTES NFR BLD AUTO: 23.9 %
MCH RBC QN AUTO: 28.9 PG (ref 26–34)
MCHC RBC AUTO-ENTMCNC: 32.3 G/DL (ref 32–36)
MCV RBC AUTO: 90 FL (ref 80–100)
MONOCYTES # BLD AUTO: 0.59 X10*3/UL (ref 0.1–1)
MONOCYTES NFR BLD AUTO: 5.7 %
NEUTROPHILS # BLD AUTO: 6.98 X10*3/UL (ref 1.2–7.7)
NEUTROPHILS NFR BLD AUTO: 67.9 %
NRBC BLD-RTO: 0 /100 WBCS (ref 0–0)
PLATELET # BLD AUTO: 334 X10*3/UL (ref 150–450)
POTASSIUM SERPL-SCNC: 3.8 MMOL/L (ref 3.5–5.3)
PROT SERPL-MCNC: 7.2 G/DL (ref 6.4–8.2)
RBC # BLD AUTO: 4.7 X10*6/UL (ref 4–5.2)
SODIUM SERPL-SCNC: 138 MMOL/L (ref 136–145)
WBC # BLD AUTO: 10.3 X10*3/UL (ref 4.4–11.3)

## 2025-08-18 PROCEDURE — 80053 COMPREHEN METABOLIC PANEL: CPT

## 2025-08-18 PROCEDURE — 85025 COMPLETE CBC W/AUTO DIFF WBC: CPT

## 2025-08-21 ENCOUNTER — APPOINTMENT (OUTPATIENT)
Age: 35
End: 2025-08-21
Payer: COMMERCIAL

## 2025-08-26 ENCOUNTER — APPOINTMENT (OUTPATIENT)
Age: 35
End: 2025-08-26
Payer: COMMERCIAL

## 2025-08-26 VITALS
SYSTOLIC BLOOD PRESSURE: 138 MMHG | OXYGEN SATURATION: 96 % | HEART RATE: 89 BPM | DIASTOLIC BLOOD PRESSURE: 98 MMHG | HEIGHT: 63 IN | BODY MASS INDEX: 49.26 KG/M2 | WEIGHT: 278 LBS

## 2025-08-26 DIAGNOSIS — R10.31 RLQ ABDOMINAL PAIN: ICD-10-CM

## 2025-08-26 DIAGNOSIS — J30.89 ENVIRONMENTAL AND SEASONAL ALLERGIES: ICD-10-CM

## 2025-08-26 DIAGNOSIS — F41.9 ANXIETY: Primary | ICD-10-CM

## 2025-08-26 DIAGNOSIS — M72.2 PLANTAR FASCIITIS OF RIGHT FOOT: ICD-10-CM

## 2025-08-26 PROCEDURE — 3008F BODY MASS INDEX DOCD: CPT | Performed by: STUDENT IN AN ORGANIZED HEALTH CARE EDUCATION/TRAINING PROGRAM

## 2025-08-26 PROCEDURE — 1036F TOBACCO NON-USER: CPT | Performed by: STUDENT IN AN ORGANIZED HEALTH CARE EDUCATION/TRAINING PROGRAM

## 2025-08-26 PROCEDURE — 99214 OFFICE O/P EST MOD 30 MIN: CPT | Performed by: STUDENT IN AN ORGANIZED HEALTH CARE EDUCATION/TRAINING PROGRAM

## 2025-08-26 RX ORDER — ESCITALOPRAM OXALATE 20 MG/1
20 TABLET ORAL 2 TIMES DAILY
COMMUNITY

## 2025-08-26 RX ORDER — BUSPIRONE HYDROCHLORIDE 5 MG/1
5 TABLET ORAL 3 TIMES DAILY PRN
Qty: 90 TABLET | Refills: 2 | Status: SHIPPED | OUTPATIENT
Start: 2025-08-26 | End: 2026-08-26

## 2025-08-26 RX ORDER — FLUTICASONE PROPIONATE 50 MCG
1 SPRAY, SUSPENSION (ML) NASAL 2 TIMES DAILY
Qty: 48 ML | Refills: 1 | Status: SHIPPED | OUTPATIENT
Start: 2025-08-26 | End: 2026-08-26

## 2025-08-26 RX ORDER — FLUTICASONE PROPIONATE 50 MCG
1 SPRAY, SUSPENSION (ML) NASAL 2 TIMES DAILY
Qty: 48 ML | Refills: 1 | Status: SHIPPED | OUTPATIENT
Start: 2025-08-26 | End: 2025-08-26

## 2025-08-26 ASSESSMENT — PATIENT HEALTH QUESTIONNAIRE - PHQ9
9. THOUGHTS THAT YOU WOULD BE BETTER OFF DEAD, OR OF HURTING YOURSELF: NOT AT ALL
6. FEELING BAD ABOUT YOURSELF - OR THAT YOU ARE A FAILURE OR HAVE LET YOURSELF OR YOUR FAMILY DOWN: NOT AT ALL
1. LITTLE INTEREST OR PLEASURE IN DOING THINGS: NOT AT ALL
1. LITTLE INTEREST OR PLEASURE IN DOING THINGS: SEVERAL DAYS
SUM OF ALL RESPONSES TO PHQ9 QUESTIONS 1 AND 2: 1
2. FEELING DOWN, DEPRESSED OR HOPELESS: NOT AT ALL
8. MOVING OR SPEAKING SO SLOWLY THAT OTHER PEOPLE COULD HAVE NOTICED. OR THE OPPOSITE, BEING SO FIGETY OR RESTLESS THAT YOU HAVE BEEN MOVING AROUND A LOT MORE THAN USUAL: NOT AT ALL
SUM OF ALL RESPONSES TO PHQ QUESTIONS 1-9: 6
3. TROUBLE FALLING OR STAYING ASLEEP OR SLEEPING TOO MUCH: MORE THAN HALF THE DAYS
2. FEELING DOWN, DEPRESSED OR HOPELESS: NOT AT ALL
7. TROUBLE CONCENTRATING ON THINGS, SUCH AS READING THE NEWSPAPER OR WATCHING TELEVISION: MORE THAN HALF THE DAYS
4. FEELING TIRED OR HAVING LITTLE ENERGY: SEVERAL DAYS
5. POOR APPETITE OR OVEREATING: NOT AT ALL
SUM OF ALL RESPONSES TO PHQ9 QUESTIONS 1 AND 2: 0

## 2025-12-01 ENCOUNTER — APPOINTMENT (OUTPATIENT)
Age: 35
End: 2025-12-01
Payer: COMMERCIAL